# Patient Record
Sex: MALE | Race: WHITE | NOT HISPANIC OR LATINO | Employment: OTHER | URBAN - METROPOLITAN AREA
[De-identification: names, ages, dates, MRNs, and addresses within clinical notes are randomized per-mention and may not be internally consistent; named-entity substitution may affect disease eponyms.]

---

## 2017-03-03 DIAGNOSIS — E78.5 DYSLIPIDEMIA: Primary | ICD-10-CM

## 2017-03-08 ENCOUNTER — OFFICE VISIT (OUTPATIENT)
Dept: CARDIOLOGY | Facility: CLINIC | Age: 62
End: 2017-03-08

## 2017-03-08 VITALS
DIASTOLIC BLOOD PRESSURE: 92 MMHG | HEIGHT: 75 IN | SYSTOLIC BLOOD PRESSURE: 126 MMHG | HEART RATE: 99 BPM | BODY MASS INDEX: 28.17 KG/M2 | WEIGHT: 226.6 LBS

## 2017-03-08 DIAGNOSIS — R06.00 DYSPNEA, UNSPECIFIED TYPE: Primary | ICD-10-CM

## 2017-03-08 DIAGNOSIS — R94.31 ABNORMAL EKG: ICD-10-CM

## 2017-03-08 DIAGNOSIS — E78.5 HYPERLIPIDEMIA, UNSPECIFIED HYPERLIPIDEMIA TYPE: ICD-10-CM

## 2017-03-08 PROCEDURE — 99213 OFFICE O/P EST LOW 20 MIN: CPT | Performed by: INTERNAL MEDICINE

## 2017-03-08 NOTE — PROGRESS NOTES
"    Subjective:     Encounter Date:03/08/2017      Patient ID: Christopher Raymond is a 61 y.o.  white male, manager of a [employer], from [place], [place].     PHYSICIAN: Reggie Monzon MD  OPHTHALMOLOGIST:  Unknown    Chief Complaint:   Chief Complaint   Patient presents with   • Follow-up     Problem List:  1. Abnormal EKG with asymptomatic bifascicular block:  a. Abnormal EKG with chronic right bundle branch block with left anterior hemiblock.  b. Remote acceptable echocardiographic GXT, November 2007.  c. Residual class I symptoms with acceptable echocardiographic GXT.  2. Remote tonsillectomy, 1967.  3. Mildly overweight.  4. Mild dyslipidemia.  5. Recent cataract surgery, 2016.    Allergies   Allergen Reactions   • Penicillins          Current Outpatient Prescriptions:   •  Ascorbic Acid (VITAMIN C PO), Take 1,500 mg by mouth daily., Disp: , Rfl:   •  aspirin 81 MG tablet, Take 81 mg by mouth daily., Disp: , Rfl:   •  Multiple Vitamins-Minerals (MULTIVITAMIN PO), Take  by mouth daily., Disp: , Rfl:     History of Present Illness Patient returns for scheduled 7-month followup. He notes that he has recently had cataract surgery, and his doctor spent a lot of his visit time on the computer. He has done well since last being seen in our office.  He would like for his weight to be down more than it is, but \"I'm doing okay.\"  He has been walking for exercise but would like to do more. He has no issues cardiac-wise with his walking.  He notes that he is sensitive to some \"muscle stuff\" in his chest with his weight-lifting (about 10 pounds of weight).  He is scheduled to have lab work and needs to have a physical done and appreciates hearing from our office in regard to his lab results. He did not get a flu shot in the fall \"because I don't ever get the flu.\" He reports that his wife has been sick since returning from a trip to [place], so he has \"upped my dose of vitamin C.\"  Patient " "otherwise denies chest pain, shortness of breath, PND, edema, palpitations, syncope or presyncope at this time.      ROS   Obtained and otherwise negative except as outlined in problem list and HPI.    Procedures       Objective:       Vitals:    03/08/17 0916 03/08/17 0919   BP: 130/86 126/92   BP Location: Left arm Left arm   Patient Position: Sitting Standing   Pulse: 99    Weight: 226 lb 9.6 oz (103 kg)    Height: 75\" (190.5 cm)      Body mass index is 28.32 kg/(m^2).   Last weight:  229 lbs.    Physical Exam   Constitutional: He appears well-developed and well-nourished.   HENT:   Head: Normocephalic and atraumatic.   Mouth/Throat: Oropharynx is clear and moist.   Neck: Neck supple. No JVD present. Carotid bruit is not present. No thyromegaly present.   Cardiovascular: Normal rate and regular rhythm.  Exam reveals no gallop, no S3 and no friction rub.    No murmur heard.  Pulses:       Dorsalis pedis pulses are 2+ on the right side, and 2+ on the left side.        Posterior tibial pulses are 2+ on the right side, and 2+ on the left side.   Pulmonary/Chest: Effort normal and breath sounds normal.   Abdominal: Soft. He exhibits no mass. There is no hepatosplenomegaly. There is no tenderness.   Lymphadenopathy:     He has no cervical adenopathy.   Neurological: He is alert. He has normal strength. No cranial nerve deficit or sensory deficit.   Skin: Skin is warm, dry and intact.       Lab Review:   Lab Results   Component Value Date    GLUCOSE 92 02/15/2016    BUN 9 02/15/2016    CREATININE 0.8 02/15/2016    EGFRIFNONA 91 12/10/2014    EGFRIFAFRI 105 12/10/2014    BCR 10 12/10/2014    CO2 31 02/15/2016    CALCIUM 9.1 02/15/2016    PROTENTOTREF 6.7 12/10/2014    ALBUMIN 4.0 02/15/2016    LABIL2 1.8 12/10/2014    AST 34 (H) 02/15/2016    ALT 42 (H) 02/15/2016       Lab Results   Component Value Date    WBC 4.62 02/15/2016    HGB 13.7 02/15/2016    HCT 42.8 02/15/2016    MCV 90.9 02/15/2016     02/15/2016 "       Lab Results   Component Value Date    TSH 0.783 02/15/2016       Lab Results   Component Value Date    TRIG 113 02/15/2016    TRIG 90 12/10/2014     Lab Results   Component Value Date    HDL 28 (L) 02/15/2016    HDL 34 (L) 12/10/2014     Lab Results   Component Value Date     (H) 12/10/2014         Assessment:   Overall continued acceptable course with no interim cardiopulmonary complaints with good functional status. We will defer additional diagnostic or therapeutic intervention from a cardiac perspective at this time. The importance of influenza immunization is discussed with the patient.  Hopefully, we will be allowed to obtain and review his upcoming laboratory results with him by letter.       Diagnosis Plan   1. Dyspnea, unspecified type     2. Abnormal EKG     3. Hyperlipidemia, unspecified hyperlipidemia type            Plan:         1. Patient to continue current medications and close follow up with the above providers.  2. Influenza immunization this coming fall is strongly encouraged.  3. Tentative cardiology follow up in March 2018, or patient may return sooner PRN.         Transcribed by Jennifer Muñoz for Dr. Jaedn Doran at 9:32 AM on 03/08/2017      IJaden MD, [facility], personally performed the services described in this documentation as scribed by the above named individual in my presence, and it is both accurate and complete. At 9:27 AM on 03/08/2017

## 2017-03-13 ENCOUNTER — OFFICE VISIT (OUTPATIENT)
Dept: FAMILY MEDICINE CLINIC | Facility: CLINIC | Age: 62
End: 2017-03-13

## 2017-03-13 VITALS
DIASTOLIC BLOOD PRESSURE: 80 MMHG | BODY MASS INDEX: 28.65 KG/M2 | WEIGHT: 230.4 LBS | HEART RATE: 102 BPM | RESPIRATION RATE: 16 BRPM | TEMPERATURE: 98.1 F | SYSTOLIC BLOOD PRESSURE: 134 MMHG | OXYGEN SATURATION: 98 % | HEIGHT: 75 IN

## 2017-03-13 DIAGNOSIS — E55.9 VITAMIN D DEFICIENCY: Primary | ICD-10-CM

## 2017-03-13 DIAGNOSIS — Z00.00 WELL ADULT EXAM: ICD-10-CM

## 2017-03-13 DIAGNOSIS — E78.49 OTHER HYPERLIPIDEMIA: ICD-10-CM

## 2017-03-13 PROBLEM — M54.50 LOW BACK PAIN: Status: ACTIVE | Noted: 2017-03-13

## 2017-03-13 LAB
25(OH)D3 SERPL-MCNC: 21.5 NG/ML
ALBUMIN SERPL-MCNC: 4.1 G/DL (ref 3.2–4.8)
ALBUMIN/GLOB SERPL: 1.5 G/DL (ref 1.5–2.5)
ALP SERPL-CCNC: 106 U/L (ref 25–100)
ALT SERPL W P-5'-P-CCNC: 88 U/L (ref 7–40)
ANION GAP SERPL CALCULATED.3IONS-SCNC: 6 MMOL/L (ref 3–11)
ARTICHOKE IGE QN: 106 MG/DL (ref 0–130)
AST SERPL-CCNC: 64 U/L (ref 0–33)
BASOPHILS # BLD AUTO: 0.02 10*3/MM3 (ref 0–0.2)
BASOPHILS NFR BLD AUTO: 0.4 % (ref 0–1)
BILIRUB BLD-MCNC: NEGATIVE MG/DL
BILIRUB SERPL-MCNC: 0.9 MG/DL (ref 0.3–1.2)
BUN BLD-MCNC: 16 MG/DL (ref 9–23)
BUN/CREAT SERPL: 20 (ref 7–25)
CALCIUM SPEC-SCNC: 9.7 MG/DL (ref 8.7–10.4)
CHLORIDE SERPL-SCNC: 103 MMOL/L (ref 99–109)
CHOLEST SERPL-MCNC: 156 MG/DL (ref 0–200)
CLARITY, POC: CLEAR
CO2 SERPL-SCNC: 32 MMOL/L (ref 20–31)
COLOR UR: YELLOW
CREAT BLD-MCNC: 0.8 MG/DL (ref 0.6–1.3)
DEPRECATED RDW RBC AUTO: 48.1 FL (ref 37–54)
EOSINOPHIL # BLD AUTO: 0.16 10*3/MM3 (ref 0.1–0.3)
EOSINOPHIL NFR BLD AUTO: 3.3 % (ref 0–3)
ERYTHROCYTE [DISTWIDTH] IN BLOOD BY AUTOMATED COUNT: 13.7 % (ref 11.3–14.5)
GFR SERPL CREATININE-BSD FRML MDRD: 99 ML/MIN/1.73
GLOBULIN UR ELPH-MCNC: 2.8 GM/DL
GLUCOSE BLD-MCNC: 82 MG/DL (ref 70–100)
GLUCOSE UR STRIP-MCNC: NEGATIVE MG/DL
HCT VFR BLD AUTO: 43 % (ref 38.9–50.9)
HDLC SERPL-MCNC: 35 MG/DL (ref 40–60)
HGB BLD-MCNC: 13.6 G/DL (ref 13.1–17.5)
IMM GRANULOCYTES # BLD: 0 10*3/MM3 (ref 0–0.03)
IMM GRANULOCYTES NFR BLD: 0 % (ref 0–0.6)
KETONES UR QL: NEGATIVE
LEUKOCYTE EST, POC: NEGATIVE
LYMPHOCYTES # BLD AUTO: 1.69 10*3/MM3 (ref 0.6–4.8)
LYMPHOCYTES NFR BLD AUTO: 35.2 % (ref 24–44)
MCH RBC QN AUTO: 30.2 PG (ref 27–31)
MCHC RBC AUTO-ENTMCNC: 31.6 G/DL (ref 32–36)
MCV RBC AUTO: 95.3 FL (ref 80–99)
MONOCYTES # BLD AUTO: 0.5 10*3/MM3 (ref 0–1)
MONOCYTES NFR BLD AUTO: 10.4 % (ref 0–12)
NEUTROPHILS # BLD AUTO: 2.43 10*3/MM3 (ref 1.5–8.3)
NEUTROPHILS NFR BLD AUTO: 50.7 % (ref 41–71)
NITRITE UR-MCNC: NEGATIVE MG/ML
PH UR: 7 [PH] (ref 5–8)
PLATELET # BLD AUTO: 233 10*3/MM3 (ref 150–450)
PMV BLD AUTO: 9.9 FL (ref 6–12)
POTASSIUM BLD-SCNC: 3.6 MMOL/L (ref 3.5–5.5)
PROT SERPL-MCNC: 6.9 G/DL (ref 5.7–8.2)
PROT UR STRIP-MCNC: NEGATIVE MG/DL
PSA SERPL-MCNC: 1.1 NG/ML (ref 0–4)
RBC # BLD AUTO: 4.51 10*6/MM3 (ref 4.2–5.76)
RBC # UR STRIP: NEGATIVE /UL
SODIUM BLD-SCNC: 141 MMOL/L (ref 132–146)
SP GR UR: 1.01 (ref 1–1.03)
TRIGL SERPL-MCNC: 145 MG/DL (ref 0–150)
TSH SERPL DL<=0.05 MIU/L-ACNC: 1 MIU/ML (ref 0.35–5.35)
UROBILINOGEN UR QL: NORMAL
WBC NRBC COR # BLD: 4.8 10*3/MM3 (ref 3.5–10.8)

## 2017-03-13 PROCEDURE — 80053 COMPREHEN METABOLIC PANEL: CPT | Performed by: FAMILY MEDICINE

## 2017-03-13 PROCEDURE — 36415 COLL VENOUS BLD VENIPUNCTURE: CPT | Performed by: FAMILY MEDICINE

## 2017-03-13 PROCEDURE — 82306 VITAMIN D 25 HYDROXY: CPT | Performed by: FAMILY MEDICINE

## 2017-03-13 PROCEDURE — 84443 ASSAY THYROID STIM HORMONE: CPT | Performed by: FAMILY MEDICINE

## 2017-03-13 PROCEDURE — 99396 PREV VISIT EST AGE 40-64: CPT | Performed by: FAMILY MEDICINE

## 2017-03-13 PROCEDURE — 85025 COMPLETE CBC W/AUTO DIFF WBC: CPT | Performed by: FAMILY MEDICINE

## 2017-03-13 PROCEDURE — 81003 URINALYSIS AUTO W/O SCOPE: CPT | Performed by: FAMILY MEDICINE

## 2017-03-13 PROCEDURE — 84153 ASSAY OF PSA TOTAL: CPT | Performed by: FAMILY MEDICINE

## 2017-03-13 PROCEDURE — 80061 LIPID PANEL: CPT | Performed by: FAMILY MEDICINE

## 2017-03-13 NOTE — PROGRESS NOTES
"Subjective   Christopher Ryamond is a 61 y.o. male and is here for a comprehensive physical exam. The patient reports no problems.        Are you taking aspirin daily? yes        The following portions of the patient's history were reviewed and updated as appropriate: allergies, current medications, past family history, past medical history, past social history, past surgical history and problem list.    Review of Systems  Do you have pain that bothers you in your daily life? no  A comprehensive review of systems was negative.    Objective   Visit Vitals   • /80   • Pulse 102   • Temp 98.1 °F (36.7 °C)   • Resp 16   • Ht 75\" (190.5 cm)   • Wt 230 lb 6.4 oz (105 kg)   • SpO2 98%   • BMI 28.8 kg/m2       General Appearance:    Alert, cooperative, no distress, appears stated age   Head:    Normocephalic, without obvious abnormality, atraumatic   Eyes:    PERRL, conjunctiva/corneas clear, EOM's intact, fundi     benign, both eyes        Ears:    Normal TM's and external ear canals, both ears   Nose:   Nares normal, septum midline, mucosa normal, no drainage    or sinus tenderness   Throat:   Lips, mucosa, and tongue normal; teeth and gums normal   Neck:   Supple, symmetrical, trachea midline, no adenopathy;        thyroid:  No enlargement/tenderness/nodules; no carotid    bruit or JVD   Back:     Symmetric, no curvature, ROM normal, no CVA tenderness   Lungs:     Clear to auscultation bilaterally, respirations unlabored   Chest wall:    No tenderness or deformity   Heart:    Regular rate and rhythm, S1 and S2 normal, no murmur, rub   or gallop   Abdomen:     Soft, non-tender, bowel sounds active all four quadrants,     no masses, no organomegaly   Genitalia:    Normal male without lesion, discharge or tenderness   Rectal:    deferred   Extremities:   Extremities normal, atraumatic, no cyanosis or edema, mild creps at the knees   Pulses:   2+ and symmetric all extremities   Skin:   Skin color, texture, turgor normal, no " rashes or lesions   Lymph nodes:   Cervical, supraclavicular, and axillary nodes normal   Neurologic:   CNII-XII intact. Normal strength, sensation and reflexes       throughout        Assessment/Plan   Healthy male exam.      1. Degenerative joint disease of the knees moderate, low back pain stable, bifascicular block stable, hyperlipidemia.  2. Patient Counseling:  --Nutrition: Stressed importance of moderation in sodium/caffeine intake, saturated fat and cholesterol, caloric balance, sufficient intake of fresh fruits, vegetables, fiber, calcium, iron, and 1 mg of folate supplement per day (for females capable of pregnancy).  --Discussed the issue of estrogen replacement, calcium supplement, and the daily use of baby aspirin.  --Exercise: Stressed the importance of regular exercise.   --Substance Abuse: Discussed cessation/primary prevention of tobacco, alcohol, or other drug use; driving or other dangerous activities under the influence; availability of treatment for abuse.    --Sexuality: Discussed sexually transmitted diseases, partner selection, use of condoms, avoidance of unintended pregnancy  and contraceptive alternatives.   --Injury prevention: Discussed safety belts, safety helmets, smoke detector, smoking near bedding or upholstery.   --Dental health: Discussed importance of regular tooth brushing, flossing, and dental visits.  --Immunizations reviewed.  --Discussed benefits of screening colonoscopy.  --After hours service discussed with patient    3. Discussed the patient's BMI with him.  The BMI is above average; BMI management plan is completed  4. Follow up in one year    See cpx form

## 2017-04-17 ENCOUNTER — LAB (OUTPATIENT)
Dept: FAMILY MEDICINE CLINIC | Facility: CLINIC | Age: 62
End: 2017-04-17

## 2017-04-17 DIAGNOSIS — E78.01 FAMILIAL HYPERCHOLESTEROLEMIA: Primary | ICD-10-CM

## 2017-04-17 LAB
ALBUMIN SERPL-MCNC: 4.2 G/DL (ref 3.2–4.8)
ALBUMIN/GLOB SERPL: 1.4 G/DL (ref 1.5–2.5)
ALP SERPL-CCNC: 99 U/L (ref 25–100)
ALT SERPL W P-5'-P-CCNC: 38 U/L (ref 7–40)
ANION GAP SERPL CALCULATED.3IONS-SCNC: 6 MMOL/L (ref 3–11)
AST SERPL-CCNC: 33 U/L (ref 0–33)
BILIRUB SERPL-MCNC: 0.6 MG/DL (ref 0.3–1.2)
BUN BLD-MCNC: 12 MG/DL (ref 9–23)
BUN/CREAT SERPL: 15 (ref 7–25)
CALCIUM SPEC-SCNC: 9.5 MG/DL (ref 8.7–10.4)
CHLORIDE SERPL-SCNC: 105 MMOL/L (ref 99–109)
CO2 SERPL-SCNC: 29 MMOL/L (ref 20–31)
CREAT BLD-MCNC: 0.8 MG/DL (ref 0.6–1.3)
GFR SERPL CREATININE-BSD FRML MDRD: 99 ML/MIN/1.73
GLOBULIN UR ELPH-MCNC: 3 GM/DL
GLUCOSE BLD-MCNC: 94 MG/DL (ref 70–100)
POTASSIUM BLD-SCNC: 3.7 MMOL/L (ref 3.5–5.5)
PROT SERPL-MCNC: 7.2 G/DL (ref 5.7–8.2)
SODIUM BLD-SCNC: 140 MMOL/L (ref 132–146)

## 2017-04-17 PROCEDURE — 80053 COMPREHEN METABOLIC PANEL: CPT | Performed by: FAMILY MEDICINE

## 2017-04-17 PROCEDURE — 36415 COLL VENOUS BLD VENIPUNCTURE: CPT | Performed by: FAMILY MEDICINE

## 2017-04-19 ENCOUNTER — TELEPHONE (OUTPATIENT)
Dept: CARDIOLOGY | Facility: CLINIC | Age: 62
End: 2017-04-19

## 2017-04-19 NOTE — TELEPHONE ENCOUNTER
Returning phone call per patient email. Patient wanted Dr. Doran to look at labs that were were done by PCP. Also, patient had too much to drink on Saturday before first lab draw and CMP was repeated by PCP on 4/17/2017.

## 2017-12-11 ENCOUNTER — OFFICE VISIT (OUTPATIENT)
Dept: ORTHOPEDIC SURGERY | Facility: CLINIC | Age: 62
End: 2017-12-11

## 2017-12-11 VITALS
DIASTOLIC BLOOD PRESSURE: 109 MMHG | BODY MASS INDEX: 30.13 KG/M2 | WEIGHT: 234.79 LBS | HEART RATE: 89 BPM | HEIGHT: 74 IN | SYSTOLIC BLOOD PRESSURE: 164 MMHG

## 2017-12-11 DIAGNOSIS — M79.675 PAIN OF TOE OF LEFT FOOT: Primary | ICD-10-CM

## 2017-12-11 PROCEDURE — 99214 OFFICE O/P EST MOD 30 MIN: CPT | Performed by: ORTHOPAEDIC SURGERY

## 2017-12-11 NOTE — PROGRESS NOTES
NEW PATIENT    Patient: Christopher Raymond  : 1955    Primary Care Provider: Reggie Monzon MD    Requesting Provider: As above    Pain of the Left Foot      History    Chief Complaint: Left third and fourth toe pain    History of Present Illness: This is a pleasant 62-year-old gentleman who is seen for several foot problems in the past.  He is here with a different problem.  He notes that over the past several years, he has had some crossing of the left third toe over the fourth toe.  It has become a nuisance.  He reports the pain is mild, rates it as 1-2 out of 10.  But he wants to make sure that he is not harming anything by taping the toes 1,2 and 3 together, to keep the third from rubbing on the fourth.  He made his own elastic band to do this.  He wears it in shoes.    Current Outpatient Prescriptions on File Prior to Visit   Medication Sig Dispense Refill   • Ascorbic Acid (VITAMIN C PO) Take 1,500 mg by mouth daily.     • aspirin 81 MG tablet Take 81 mg by mouth daily.     • Multiple Vitamins-Minerals (MULTIVITAMIN PO) Take  by mouth daily.       No current facility-administered medications on file prior to visit.       Allergies   Allergen Reactions   • Penicillins       Past Medical History:   Diagnosis Date   • Hematospermia      Past Surgical History:   Procedure Laterality Date   • CATARACT EXTRACTION     • HERNIA REPAIR     • TONSILLECTOMY       Family History   Problem Relation Age of Onset   • Hypotension Mother    • Stroke Father    • Hypertension Father       Social History     Social History   • Marital status:      Spouse name: N/A   • Number of children: N/A   • Years of education: N/A     Occupational History   • Not on file.     Social History Main Topics   • Smoking status: Never Smoker   • Smokeless tobacco: Never Used   • Alcohol use 0.6 - 1.2 oz/week     1 - 2 Cans of beer per week      Comment: very rare   • Drug use: No   • Sexual activity: Defer     Other Topics Concern  "  • Not on file     Social History Narrative        Review of Systems   Constitutional: Negative.    HENT: Negative.    Eyes: Negative.    Respiratory: Negative.    Cardiovascular: Negative.    Gastrointestinal: Negative.    Endocrine: Negative.    Genitourinary: Negative.    Musculoskeletal: Positive for arthralgias.   Skin: Negative.    Allergic/Immunologic: Negative.    Neurological: Negative.    Hematological: Negative.    Psychiatric/Behavioral: Negative.        The following portions of the patient's history were reviewed and updated as appropriate: allergies, current medications, past family history, past medical history, past social history, past surgical history and problem list.    Physical Exam:   BP (!) 164/109  Pulse 89  Ht 189 cm (74.41\")  Wt 107 kg (234 lb 12.6 oz)  BMI 29.81 kg/m2  GENERAL: Body habitus: normal weight for height    Lower extremity edema: Left: none; Right: none    Varicose veins:  Left: mild; Right: mild    Gait: normal     Mental Status:  awake and alert; oriented to person, place, and time    Voice:  clear  SKIN:  Normal    Hair Growth:  Right:normal; Left:  normal  NAILS: Toenails: normal  HEENT: Head: Normocephalic, atraumatic,  without obvious abnormality.  eye: normal external eye, no icterus  ears: normal external ears  nose: normal external nose  pharynx: dental hygiene adequate  PULM:  Repiratory effort normal  CV:  Dorsalis Pedis:  Right: 2+; Left:2+    Posterior Tibial: Right:2+; Left:2+    Capillary Refill:  Brisk  MSK:  Hand:right handed      Tibia:  Right:  non tender; Left:  non tender      Ankle:  Right: non tender, ROM  normal and motor function  normal; Left:  non tender, ROM  normal and motor function  normal      Foot:  Right:  non tender; Left:  No tenderness in the left foot today.  In stance the second and third toes angle laterally, and the third toe does cross the nail fold and the nail of the fourth toe.  The fourth toe also curls at the PIP and DIP " joint, angling it under the third toe.  Toes are mobile, they're easily .      NEURO:      [place] 5.07 monofilament test: normal    Lower extremity sensation: intact     Reflexes:  Biceps:  Right:  not tested; Left:  not tested           Quads:  Right:  not tested; Left:  not tested           Ankle:  Right:  not tested; Left:  not tested      Calf Atrophy:none    Motor Function: all 5/5         Medical Decision Making    Data Review:   ordered and reviewed x-rays today    Assessment and Plan/ Diagnosis/Treatment options:   1. Pain of toe of left foot  He does have lateral angulation of the second and third toes at the metatarsal phalangeal joint, that makes the third toe crosses the fourth toe at the nail bed, the fourth toe also curls which contributes to the crossing.  We discussed the options.  I explained that I don't think he is harming himself at all by using the elastic band to keep the toes from rubbing.  His x-rays were compared to those from 2010 and there is really no change.  So there is no evidence that it is causing valgus of the great toe or causing any problem.  I also showed him how to use a banana pad, that might work also to keep the toes from rubbing.  I explained that the only surgery would involve straightening all 3 toes, and given the low level of pain that he has at would not recommend that.  He was satisfied, he primarily want to make sure he was not causing harm.  I'll be happy to see him any time  -

## 2018-03-12 ENCOUNTER — OFFICE VISIT (OUTPATIENT)
Dept: FAMILY MEDICINE CLINIC | Facility: CLINIC | Age: 63
End: 2018-03-12

## 2018-03-12 VITALS
OXYGEN SATURATION: 96 % | BODY MASS INDEX: 28.23 KG/M2 | HEIGHT: 75 IN | TEMPERATURE: 99.7 F | DIASTOLIC BLOOD PRESSURE: 88 MMHG | SYSTOLIC BLOOD PRESSURE: 158 MMHG | RESPIRATION RATE: 16 BRPM | HEART RATE: 115 BPM | WEIGHT: 227 LBS

## 2018-03-12 DIAGNOSIS — J01.00 SUBACUTE MAXILLARY SINUSITIS: ICD-10-CM

## 2018-03-12 DIAGNOSIS — J40 BRONCHITIS: Primary | ICD-10-CM

## 2018-03-12 PROCEDURE — 99213 OFFICE O/P EST LOW 20 MIN: CPT | Performed by: FAMILY MEDICINE

## 2018-03-12 RX ORDER — DOXYCYCLINE HYCLATE 100 MG/1
100 CAPSULE ORAL 2 TIMES DAILY
Qty: 20 CAPSULE | Refills: 0 | Status: SHIPPED | OUTPATIENT
Start: 2018-03-12 | End: 2018-03-21

## 2018-03-13 NOTE — PROGRESS NOTES
"Subjective   Christopher Raymond is a 62 y.o. male.     Sinusitis   This is a new (recent trip came back with uri now has yellow sputum) problem. The current episode started in the past 7 days. The problem is unchanged. The maximum temperature recorded prior to his arrival was 100.4 - 100.9 F. The fever has been present for 1 to 2 days. The pain is mild. Associated symptoms include congestion, coughing and sinus pressure. Pertinent negatives include no chills, shortness of breath or swollen glands. Past treatments include acetaminophen and lying down. The treatment provided mild relief.        The following portions of the patient's history were reviewed and updated as appropriate: allergies, current medications, past social history and problem list.    Review of Systems   Constitutional: Negative for chills and fever.   HENT: Positive for congestion and sinus pressure.    Respiratory: Positive for cough. Negative for shortness of breath.    Cardiovascular: Negative for chest pain and leg swelling.   Gastrointestinal: Negative for diarrhea, nausea and vomiting.   Genitourinary: Negative for dysuria and hematuria.   Musculoskeletal: Positive for myalgias. Negative for joint swelling.       Objective   /88 (BP Location: Left arm, Patient Position: Sitting, Cuff Size: Adult)   Pulse 115   Temp 99.7 °F (37.6 °C) (Oral)   Resp 16   Ht 190.5 cm (75\")   Wt 103 kg (227 lb)   SpO2 96%   BMI 28.37 kg/m²   Physical Exam   Constitutional: He is oriented to person, place, and time. He appears well-developed and well-nourished. He is cooperative.   HENT:   Head: Normocephalic.   Right Ear: External ear normal.   Left Ear: External ear normal.   Nose: Nose normal.   Mouth/Throat: Oropharynx is clear and moist.   Cloudy yellow pnd   Eyes: Conjunctivae are normal. Pupils are equal, round, and reactive to light. No scleral icterus.   Neck: Neck supple. Carotid bruit is not present. No thyromegaly present.   Cardiovascular: " Normal rate and regular rhythm.    Pulmonary/Chest: Effort normal.   Scattered upper rhonchi   Abdominal: There is no hepatosplenomegaly.   Musculoskeletal: Normal range of motion.   Neurological: He is alert and oriented to person, place, and time.   No focal deficits no lateralizing signs   Skin: Skin is warm and dry. No rash noted.   Psychiatric: He has a normal mood and affect. Cognition and memory are normal.   Nursing note and vitals reviewed.      Assessment/Plan   Problem List Items Addressed This Visit     None      Visit Diagnoses     Bronchitis    -  Primary          New Medications Ordered This Visit   Medications   • doxycycline (VIBRAMYCIN) 100 MG capsule     Sig: Take 1 capsule by mouth 2 (Two) Times a Day.     Dispense:  20 capsule     Refill:  0

## 2018-03-14 ENCOUNTER — OFFICE VISIT (OUTPATIENT)
Dept: CARDIOLOGY | Facility: CLINIC | Age: 63
End: 2018-03-14

## 2018-03-14 VITALS
HEIGHT: 75 IN | HEART RATE: 108 BPM | WEIGHT: 219 LBS | BODY MASS INDEX: 27.23 KG/M2 | SYSTOLIC BLOOD PRESSURE: 118 MMHG | DIASTOLIC BLOOD PRESSURE: 78 MMHG

## 2018-03-14 DIAGNOSIS — R94.31 ABNORMAL EKG: Primary | ICD-10-CM

## 2018-03-14 DIAGNOSIS — R06.09 DYSPNEA ON EXERTION: ICD-10-CM

## 2018-03-14 DIAGNOSIS — R09.89 LABILE HYPERTENSION: ICD-10-CM

## 2018-03-14 DIAGNOSIS — I45.2 BIFASCICULAR BLOCK: ICD-10-CM

## 2018-03-14 PROCEDURE — 93000 ELECTROCARDIOGRAM COMPLETE: CPT | Performed by: INTERNAL MEDICINE

## 2018-03-14 PROCEDURE — 99214 OFFICE O/P EST MOD 30 MIN: CPT | Performed by: INTERNAL MEDICINE

## 2018-03-14 NOTE — PROGRESS NOTES
"    Subjective:     Encounter Date:03/14/2018    Patient ID: Christopher Raymond is a 62 y.o.  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     PHYSICIAN: Reggie Monzon MD  OPHTHALMOLOGIST:  Unknown       Chief Complaint:   Chief Complaint   Patient presents with   • Hyperlipidemia     Problem List:  1. Abnormal EKG with asymptomatic bifascicular block:  a. Abnormal EKG with chronic right bundle branch block with left anterior hemiblock.  b. Remote acceptable echocardiographic GXT, November 2007.  c. Residual class I symptoms with acceptable echocardiographic GXT.  2. Remote tonsillectomy, 1967.  3. Mildly overweight, BMI 27.4.  4. Mild dyslipidemia.  5. Remote cataract surgery, 2016.     Allergies   Allergen Reactions   • Penicillins          Current Outpatient Prescriptions:   •  Ascorbic Acid (VITAMIN C PO), Take 1,000 mg by mouth Daily., Disp: , Rfl:   •  aspirin 81 MG tablet, Take 81 mg by mouth daily., Disp: , Rfl:   •  doxycycline (VIBRAMYCIN) 100 MG capsule, Take 1 capsule by mouth 2 (Two) Times a Day., Disp: 20 capsule, Rfl: 0  •  Multiple Vitamins-Minerals (MULTIVITAMIN PO), Take  by mouth daily., Disp: , Rfl:     HISTORY OF PRESENT ILLNESS: Patient returns for scheduled annual followup. He says that he was recently diagnosed with bronchitis and a sinus infection, but \"I'm on the downside of it.\"  He notes that he was traveling last week and must have picked it up then.  He says that it bothers him mostly at night with the cough and congestion.  His EKG today is abnormal but stable, other than his heart rate is up, and he is told that this could be a result of his sickness.  The patient states that he gets some \"surface muscle\" chest pain, on one side or the other, and it concerns him when it is on the left side of his chest.  It lasts for a few minutes when it occurs.  He sometimes experiences this pain when he is driving because he drives with his left arm " "extended, and if he changes the position of his arm, the pain goes away.  He does not have any shortness of breath with it, and he does not experience it with activity.  His blood pressure has been elevated on occasion when he has been seen in other doctor's offices, but it is normal today; he states it has been \"trending up.\"  He reports that he donates blood every 8 weeks, and his blood pressure is usually less than 140/90, \"but it's close to it.\"  He states that he probably has not been drinking as much water as he normally does because he has been working from home instead of going into the office.  He is otherwise active on a daily basis and continues to enjoy playing [employer]; he has no symptoms from a cardiopulmonary perspective with his activities.  Patient otherwise denies chest pain, shortness of breath, PND, edema, palpitations, syncope or presyncope at this time.        ROS   Obtained and otherwise negative except as outlined in problem list and HPI.      ECG 12 Lead  Date/Time: 3/14/2018 10:06 AM  Performed by: JOSHUA TOTH  Authorized by: JOSHUA TOTH   Comparison: not compared with previous ECG   Previous ECG: no previous ECG available  Rhythm: sinus tachycardia  BPM: 109  Conduction: complete RBBB and LPFB  Clinical impression: abnormal ECG  Comments:  ms   ms  QTc 514 ms               Objective:       Vitals:    03/14/18 0855 03/14/18 0858 03/14/18 0913   BP: 125/90 105/80 118/78   BP Location: Left arm Left arm Right arm   Patient Position: Sitting Standing Sitting   Pulse: 108     Weight: 99.3 kg (219 lb)     Height: 190.5 cm (75\")       Body mass index is 27.37 kg/m².   Last weight:  226 lbs.    Physical Exam   Constitutional: He is oriented to person, place, and time. He appears well-developed and well-nourished.   Neck: No JVD present. Carotid bruit is not present. No thyromegaly present.   Cardiovascular: Regular rhythm, S1 normal, S2 normal and normal heart sounds.  Exam " reveals no gallop, no S3 and no friction rub.    No murmur heard.  Pulses:       Dorsalis pedis pulses are 2+ on the right side, and 2+ on the left side.        Posterior tibial pulses are 2+ on the right side, and 2+ on the left side.   Pulmonary/Chest: Effort normal and breath sounds normal. He has no wheezes. He has no rhonchi. He has no rales.   Abdominal: Soft. He exhibits no mass. There is no hepatosplenomegaly. There is no tenderness. There is no guarding.   Bowel sounds audible x4   Musculoskeletal: Normal range of motion. He exhibits no edema.   Lymphadenopathy:     He has no cervical adenopathy.   Neurological: He is alert and oriented to person, place, and time.   Skin: Skin is warm, dry and intact. No rash noted.   Vitals reviewed.        Lab Review:   Lab Results   Component Value Date    GLUCOSE 94 04/17/2017    BUN 12 04/17/2017    CREATININE 0.80 04/17/2017    EGFRIFNONA 99 04/17/2017    EGFRIFAFRI 105 12/10/2014    BCR 15.0 04/17/2017    CO2 29.0 04/17/2017    CALCIUM 9.5 04/17/2017    PROTENTOTREF 6.7 12/10/2014    ALBUMIN 4.20 04/17/2017    LABIL2 1.4 (L) 04/17/2017    AST 33 04/17/2017    ALT 38 04/17/2017   Sodium - 140  Potassium - 3.7  Chloride - 105  CO2 - 29.0    Lab Results   Component Value Date    WBC 4.80 03/13/2017    HGB 13.6 03/13/2017    HCT 43.0 03/13/2017    MCV 95.3 03/13/2017     03/13/2017       Lab Results   Component Value Date    TSH 1.005 03/13/2017       Lab Results   Component Value Date    CHOL 156 03/13/2017     Lab Results   Component Value Date    TRIG 145 03/13/2017    TRIG 113 02/15/2016     Lab Results   Component Value Date    HDL 35 (L) 03/13/2017    HDL 28 (L) 02/15/2016     Lab Results   Component Value Date     03/13/2017     02/15/2016     Vitamin D - 21.5 (03/13/2017)  PSA - 1.100 (03/13/2017)      Assessment:   Overall continued acceptable course with no interim cardiopulmonary complaints with acceptable functional status. We will defer  additional diagnostic or therapeutic intervention from a cardiac perspective at this time.  The patient is told that it might be of benefit for him to purchase a home blood pressure monitor and check his blood pressure every now and then at home.  If his heart rate remains elevated, despite hydration and resolution of his febrile illness, he may need echocardiogram/event recorder monitor.       Diagnosis Plan   1. Abnormal EKG  Continue current treatment   2. Dyspnea on exertion  No recurrent angina pectoris or CHF.     3. Bifascicular block  EKG stable and unchanged   4. Labile hypertension  Blood pressure monitoring, weight loss, and followup recommended          Plan:         1. Patient to continue current medications and close follow up with the above providers.  2. Tentative cardiology follow up in March 2019, or patient may return sooner PRN.       Transcribed by Jennifer Muñoz for Dr. Jaden Doran at 9:06 AM on 03/14/2018       IJaden MD, FACC, personally performed the services described in this documentation as scribed by the above named individual in my presence, and it is both accurate and complete. At 10:04 AM on 03/14/2018

## 2018-03-20 ENCOUNTER — OFFICE VISIT (OUTPATIENT)
Dept: FAMILY MEDICINE CLINIC | Facility: CLINIC | Age: 63
End: 2018-03-20

## 2018-03-20 VITALS
BODY MASS INDEX: 27.35 KG/M2 | HEIGHT: 75 IN | HEART RATE: 98 BPM | OXYGEN SATURATION: 98 % | WEIGHT: 220 LBS | DIASTOLIC BLOOD PRESSURE: 88 MMHG | RESPIRATION RATE: 16 BRPM | SYSTOLIC BLOOD PRESSURE: 128 MMHG

## 2018-03-20 DIAGNOSIS — E55.9 VITAMIN D DEFICIENCY: ICD-10-CM

## 2018-03-20 DIAGNOSIS — Z00.00 WELL ADULT EXAM: Primary | ICD-10-CM

## 2018-03-20 LAB
BILIRUB BLD-MCNC: NEGATIVE MG/DL
CLARITY, POC: CLEAR
COLOR UR: YELLOW
GLUCOSE UR STRIP-MCNC: NEGATIVE MG/DL
KETONES UR QL: NEGATIVE
LEUKOCYTE EST, POC: NEGATIVE
NITRITE UR-MCNC: NEGATIVE MG/ML
PH UR: 6.5 [PH] (ref 5–8)
PROT UR STRIP-MCNC: NEGATIVE MG/DL
RBC # UR STRIP: NEGATIVE /UL
SP GR UR: 1.01 (ref 1–1.03)
UROBILINOGEN UR QL: NORMAL

## 2018-03-20 PROCEDURE — 81003 URINALYSIS AUTO W/O SCOPE: CPT | Performed by: FAMILY MEDICINE

## 2018-03-20 PROCEDURE — 36415 COLL VENOUS BLD VENIPUNCTURE: CPT | Performed by: FAMILY MEDICINE

## 2018-03-20 PROCEDURE — 99396 PREV VISIT EST AGE 40-64: CPT | Performed by: FAMILY MEDICINE

## 2018-03-20 RX ORDER — CHOLECALCIFEROL (VITAMIN D3) 50 MCG
2000 TABLET ORAL DAILY
COMMUNITY

## 2018-03-21 NOTE — PROGRESS NOTES
"Subjective   Christopher Raymond is a 62 y.o. male and is here for a comprehensive physical exam. The patient reports no problems.        Are you taking aspirin daily? yes        The following portions of the patient's history were reviewed and updated as appropriate: allergies, current medications, past family history, past medical history, past social history, past surgical history and problem list.    Review of Systems  Do you have pain that bothers you in your daily life? no  A comprehensive review of systems was negative.    Objective   /88   Pulse 98   Resp 16   Ht 190.5 cm (75\")   Wt 99.8 kg (220 lb)   SpO2 98%   BMI 27.50 kg/m²     General Appearance:    Alert, cooperative, no distress, appears stated age   Head:    Normocephalic, without obvious abnormality, atraumatic   Eyes:    PERRL, conjunctiva/corneas clear, EOM's intact, fundi     benign, both eyes        Ears:    Normal TM's and external ear canals, both ears   Nose:   Nares normal, septum midline, mucosa normal, no drainage    or sinus tenderness   Throat:   Lips, mucosa, and tongue normal; teeth and gums normal   Neck:   Supple, symmetrical, trachea midline, no adenopathy;        thyroid:  No enlargement/tenderness/nodules; no carotid    bruit or JVD   Back:     Symmetric, no curvature, ROM normal, no CVA tenderness   Lungs:     Clear to auscultation bilaterally, respirations unlabored   Chest wall:    No tenderness or deformity   Heart:    Regular rate and rhythm, S1 and S2 normal, no murmur, rub   or gallop   Abdomen:     Soft, non-tender, bowel sounds active all four quadrants,     no masses, no organomegaly   Genitalia:    Normal male without lesion, discharge or tenderness   Rectal:    Normal tone, normal prostate, no masses or tenderness;    guaiac negative stool   Extremities:   Extremities normal, atraumatic, no cyanosis or edema   Pulses:   2+ and symmetric all extremities   Skin:   Skin color, texture, turgor normal, no rashes or " lesions, numerous benign nevi   Lymph nodes:   Cervical, supraclavicular, and axillary nodes normal   Neurologic:   CNII-XII intact. Normal strength, sensation and reflexes       throughout        Assessment/Plan   Healthy male exam.      1. RBBB,LAFB,OA  2. Patient Counseling:  --Nutrition: Stressed importance of moderation in sodium/caffeine intake, saturated fat and cholesterol, caloric balance, sufficient intake of fresh fruits, vegetables, fiber, calcium, iron, and 1 mg of folate supplement per day (for females capable of pregnancy).  --Discussed the issue of estrogen replacement, calcium supplement, and the daily use of baby aspirin.  --Exercise: Stressed the importance of regular exercise.   --Substance Abuse: Discussed cessation/primary prevention of tobacco, alcohol, or other drug use; driving or other dangerous activities under the influence; availability of treatment for abuse.    --Sexuality: Discussed sexually transmitted diseases, partner selection, use of condoms, avoidance of unintended pregnancy  and contraceptive alternatives.   --Injury prevention: Discussed safety belts, safety helmets, smoke detector, smoking near bedding or upholstery.   --Dental health: Discussed importance of regular tooth brushing, flossing, and dental visits.  --Immunizations reviewed.  --Discussed benefits of screening colonoscopy.  --After hours service discussed with patient    3. Discussed the patient's BMI with him.  The BMI is in the acceptable range  4. Follow up in one year      See cpx form

## 2018-03-22 LAB
25(OH)D3+25(OH)D2 SERPL-MCNC: 36.6 NG/ML
ALBUMIN SERPL-MCNC: 4 G/DL (ref 3.2–4.8)
ALBUMIN/GLOB SERPL: 1.4 G/DL (ref 1.5–2.5)
ALP SERPL-CCNC: 83 U/L (ref 25–100)
ALT SERPL-CCNC: 60 U/L (ref 7–40)
AST SERPL-CCNC: 42 U/L (ref 0–33)
BASOPHILS # BLD AUTO: 0.01 10*3/MM3 (ref 0–0.2)
BASOPHILS NFR BLD AUTO: 0.2 % (ref 0–1)
BILIRUB SERPL-MCNC: 0.6 MG/DL (ref 0.3–1.2)
BUN SERPL-MCNC: 10 MG/DL (ref 9–23)
BUN/CREAT SERPL: 11.1 (ref 7–25)
CALCIUM SERPL-MCNC: 9.1 MG/DL (ref 8.7–10.4)
CHLORIDE SERPL-SCNC: 106 MMOL/L (ref 99–109)
CHOLEST SERPL-MCNC: 166 MG/DL (ref 0–200)
CO2 SERPL-SCNC: 31 MMOL/L (ref 20–31)
CREAT SERPL-MCNC: 0.9 MG/DL (ref 0.6–1.3)
EOSINOPHIL # BLD AUTO: 0.09 10*3/MM3 (ref 0–0.3)
EOSINOPHIL NFR BLD AUTO: 1.8 % (ref 0–3)
ERYTHROCYTE [DISTWIDTH] IN BLOOD BY AUTOMATED COUNT: 13.6 % (ref 11.3–14.5)
GFR SERPLBLD CREATININE-BSD FMLA CKD-EPI: 104 ML/MIN/1.73
GFR SERPLBLD CREATININE-BSD FMLA CKD-EPI: 86 ML/MIN/1.73
GLOBULIN SER CALC-MCNC: 2.8 GM/DL
GLUCOSE SERPL-MCNC: 99 MG/DL (ref 70–100)
HCT VFR BLD AUTO: 43.5 % (ref 38.9–50.9)
HCV AB S/CO SERPL IA: 0.1 S/CO RATIO (ref 0–0.9)
HDLC SERPL-MCNC: 33 MG/DL (ref 40–60)
HGB BLD-MCNC: 14.2 G/DL (ref 13.1–17.5)
IMM GRANULOCYTES # BLD: 0.01 10*3/MM3 (ref 0–0.03)
IMM GRANULOCYTES NFR BLD: 0.2 % (ref 0–0.6)
LDLC SERPL CALC-MCNC: 107 MG/DL (ref 0–100)
LYMPHOCYTES # BLD AUTO: 1.82 10*3/MM3 (ref 0.6–4.8)
LYMPHOCYTES NFR BLD AUTO: 36.5 % (ref 24–44)
MCH RBC QN AUTO: 31.1 PG (ref 27–31)
MCHC RBC AUTO-ENTMCNC: 32.6 G/DL (ref 32–36)
MCV RBC AUTO: 95.2 FL (ref 80–99)
MONOCYTES # BLD AUTO: 0.48 10*3/MM3 (ref 0–1)
MONOCYTES NFR BLD AUTO: 9.6 % (ref 0–12)
NEUTROPHILS # BLD AUTO: 2.58 10*3/MM3 (ref 1.5–8.3)
NEUTROPHILS NFR BLD AUTO: 51.7 % (ref 41–71)
PLATELET # BLD AUTO: 262 10*3/MM3 (ref 150–450)
POTASSIUM SERPL-SCNC: 4 MMOL/L (ref 3.5–5.5)
PROT SERPL-MCNC: 6.8 G/DL (ref 5.7–8.2)
PSA SERPL-MCNC: 0.92 NG/ML (ref 0–4)
RBC # BLD AUTO: 4.57 10*6/MM3 (ref 4.2–5.76)
SODIUM SERPL-SCNC: 141 MMOL/L (ref 132–146)
TRIGL SERPL-MCNC: 131 MG/DL (ref 0–150)
TSH SERPL DL<=0.005 MIU/L-ACNC: 0.73 MIU/ML (ref 0.35–5.35)
VLDLC SERPL CALC-MCNC: 26.2 MG/DL
WBC # BLD AUTO: 4.99 10*3/MM3 (ref 3.5–10.8)

## 2018-08-14 ENCOUNTER — OFFICE VISIT (OUTPATIENT)
Dept: FAMILY MEDICINE CLINIC | Facility: CLINIC | Age: 63
End: 2018-08-14

## 2018-08-14 VITALS
WEIGHT: 228 LBS | BODY MASS INDEX: 28.35 KG/M2 | OXYGEN SATURATION: 99 % | SYSTOLIC BLOOD PRESSURE: 186 MMHG | RESPIRATION RATE: 16 BRPM | HEIGHT: 75 IN | HEART RATE: 98 BPM | DIASTOLIC BLOOD PRESSURE: 100 MMHG

## 2018-08-14 DIAGNOSIS — M54.31 SCIATICA OF RIGHT SIDE: Primary | ICD-10-CM

## 2018-08-14 PROCEDURE — 99213 OFFICE O/P EST LOW 20 MIN: CPT | Performed by: FAMILY MEDICINE

## 2018-08-14 RX ORDER — BACLOFEN 10 MG/1
10 TABLET ORAL 3 TIMES DAILY
Qty: 30 TABLET | Refills: 0 | Status: SHIPPED | OUTPATIENT
Start: 2018-08-14 | End: 2018-08-23 | Stop reason: SDUPTHER

## 2018-08-14 RX ORDER — METHYLPREDNISOLONE 4 MG/1
TABLET ORAL
Qty: 1 EACH | Refills: 0 | Status: SHIPPED | OUTPATIENT
Start: 2018-08-14 | End: 2018-08-29

## 2018-08-15 ENCOUNTER — TELEPHONE (OUTPATIENT)
Dept: FAMILY MEDICINE CLINIC | Facility: CLINIC | Age: 63
End: 2018-08-15

## 2018-08-15 ENCOUNTER — OFFICE VISIT (OUTPATIENT)
Dept: FAMILY MEDICINE CLINIC | Facility: CLINIC | Age: 63
End: 2018-08-15

## 2018-08-15 VITALS
HEIGHT: 75 IN | DIASTOLIC BLOOD PRESSURE: 88 MMHG | RESPIRATION RATE: 16 BRPM | BODY MASS INDEX: 28.5 KG/M2 | OXYGEN SATURATION: 96 % | SYSTOLIC BLOOD PRESSURE: 142 MMHG | HEART RATE: 105 BPM

## 2018-08-15 DIAGNOSIS — M54.31 SCIATICA OF RIGHT SIDE: Primary | ICD-10-CM

## 2018-08-15 PROCEDURE — 96372 THER/PROPH/DIAG INJ SC/IM: CPT | Performed by: FAMILY MEDICINE

## 2018-08-15 PROCEDURE — 99213 OFFICE O/P EST LOW 20 MIN: CPT | Performed by: FAMILY MEDICINE

## 2018-08-15 RX ORDER — TRIAMCINOLONE ACETONIDE 40 MG/ML
40 INJECTION, SUSPENSION INTRA-ARTICULAR; INTRAMUSCULAR ONCE
Status: COMPLETED | OUTPATIENT
Start: 2018-08-15 | End: 2018-08-15

## 2018-08-15 RX ORDER — IBUPROFEN 600 MG/1
600 TABLET ORAL EVERY 6 HOURS PRN
Qty: 50 TABLET | Refills: 0 | Status: SHIPPED | OUTPATIENT
Start: 2018-08-15 | End: 2018-08-28 | Stop reason: SDUPTHER

## 2018-08-15 RX ADMIN — TRIAMCINOLONE ACETONIDE 40 MG: 40 INJECTION, SUSPENSION INTRA-ARTICULAR; INTRAMUSCULAR at 16:13

## 2018-08-15 NOTE — PROGRESS NOTES
"Jenifer Raymond is a 62 y.o. male.     Leg Pain    The incident occurred 12 to 24 hours ago (Bent over to put on socks and pain hit in right hip and lateral thigh). The incident occurred at home. There was no injury mechanism. The pain is present in the right thigh. The quality of the pain is described as burning and aching. The pain is moderate. The pain has been intermittent since onset. Associated symptoms include numbness and tingling. The symptoms are aggravated by movement and weight bearing. He has tried NSAIDs and non-weight bearing for the symptoms. The treatment provided no relief.        The following portions of the patient's history were reviewed and updated as appropriate: allergies, current medications, past social history and problem list.    Review of Systems   Constitutional: Negative.    HENT: Negative for congestion and postnasal drip.    Respiratory: Negative.    Cardiovascular: Negative for chest pain, palpitations and leg swelling.   Gastrointestinal: Negative.    Musculoskeletal: Positive for back pain. Negative for arthralgias, gait problem and myalgias.   Neurological: Positive for tingling and numbness. Negative for dizziness, tremors and weakness.   Psychiatric/Behavioral: Negative for behavioral problems and dysphoric mood. The patient is not nervous/anxious.        Objective   BP (!) 186/100   Pulse 98   Resp 16   Ht 190.5 cm (75\")   Wt 103 kg (228 lb)   SpO2 99%   BMI 28.50 kg/m²   Physical Exam   Constitutional: He is oriented to person, place, and time. He appears well-developed and well-nourished. He is cooperative.   HENT:   Head: Normocephalic.   Right Ear: External ear normal.   Left Ear: External ear normal.   Nose: Nose normal.   Mouth/Throat: Oropharynx is clear and moist.   Eyes: Pupils are equal, round, and reactive to light. Conjunctivae are normal. No scleral icterus.   Neck: Neck supple. Carotid bruit is not present. No thyromegaly present. "   Cardiovascular: Normal rate and regular rhythm.    Pulmonary/Chest: Effort normal and breath sounds normal.   Abdominal: There is no hepatosplenomegaly.   Musculoskeletal: Normal range of motion. He exhibits tenderness.   Radicular pain right lateral thigh slr negative  Sensation intact strength normal   Neurological: He is alert and oriented to person, place, and time.   No focal deficits no lateralizing signs   Skin: Skin is warm and dry. No rash noted.   Psychiatric: He has a normal mood and affect. Cognition and memory are normal.   Nursing note and vitals reviewed.      Assessment/Plan   Problem List Items Addressed This Visit     None      Visit Diagnoses     Sciatica of right side    -  Primary          New Medications Ordered This Visit   Medications   • MethylPREDNISolone (MEDROL, SEBASTIEN,) 4 MG tablet     Sig: Take as directed on package instructions.     Dispense:  1 each     Refill:  0   • baclofen (LIORESAL) 10 MG tablet     Sig: Take 1 tablet by mouth 3 (Three) Times a Day.     Dispense:  30 tablet     Refill:  0     If no better in 2 days call or rtc for further eval.

## 2018-08-15 NOTE — TELEPHONE ENCOUNTER
Called informed pt, he verbalized understanding and he has an appt today 8/15/18.  KATHERINE Garcia  [facility]    ----- Message from Reggie Monzon MD sent at 8/15/2018 11:59 AM EDT -----  Regarding: RE: PT MEDS  Stop them and come in this afternoon we can give it in a shot  ----- Message -----  From: Mauro Garcia MA  Sent: 8/15/2018  10:45 AM  To: Reggie Monzon MD  Subject: FW: PT MEDS                                          ----- Message -----  From: Elle Cheema  Sent: 8/15/2018   8:52 AM  To: Mauro Garcia MA  Subject: PT MEDS                                          PT CALLED IN ASKING IF THE MEDS HE WAS PRESCRIBED AT  YESTERDAY VISIT WOULD CAUSE HIM TO VOMIT. HE STATED HE WAS UNABLE TO KEEP ANYTHING DOWN SINCE TAKING THEM

## 2018-08-16 RX ORDER — DIAZEPAM 5 MG/1
5 TABLET ORAL EVERY 8 HOURS PRN
Qty: 30 TABLET | Refills: 0
Start: 2018-08-16 | End: 2018-08-17

## 2018-08-16 NOTE — PROGRESS NOTES
"Subjective   Christopher Raymond is a 62 y.o. male.     Patient complains one of his medications made him throw up he thinks it may be the tramadol but also was unsure about the steroid pack.  He did receive some relief from his sciatica but it seems to be returning.         The following portions of the patient's history were reviewed and updated as appropriate: allergies, current medications, past social history and problem list.    Review of Systems   Constitutional: Negative for activity change and unexpected weight change.   HENT: Negative for congestion and sore throat.    Respiratory: Negative for cough and shortness of breath.    Cardiovascular: Negative for chest pain, palpitations and leg swelling.   Gastrointestinal: Negative for diarrhea, nausea and vomiting.   Genitourinary: Negative for dysuria and hematuria.   Musculoskeletal: Positive for arthralgias and back pain. Negative for joint swelling.   Skin: Negative for color change and rash.   Allergic/Immunologic: Negative for environmental allergies and food allergies.   Neurological: Positive for numbness. Negative for syncope and headaches.       Objective   /88   Pulse 105   Resp 16   Ht 190.5 cm (75\")   SpO2 96%   BMI 28.50 kg/m²   Physical Exam   Constitutional: He appears well-developed and well-nourished.   HENT:   Head: Normocephalic and atraumatic.   Eyes: Pupils are equal, round, and reactive to light. Conjunctivae are normal.   Neck: Neck supple.   Cardiovascular: Normal rate and regular rhythm.    Pulmonary/Chest: Effort normal and breath sounds normal.   Abdominal: Soft. Bowel sounds are normal.   Musculoskeletal: He exhibits tenderness.   Radicular pain to the right hip and lateral thigh   Lymphadenopathy:     He has no cervical adenopathy.   Skin: Skin is warm and dry.   Nursing note and vitals reviewed.      Assessment/Plan   Problem List Items Addressed This Visit     None      Visit Diagnoses     Sciatica of right side    -  " Primary    Relevant Medications    triamcinolone acetonide (KENALOG-40) injection 40 mg (Completed)          New Medications Ordered This Visit   Medications   • ibuprofen (ADVIL,MOTRIN) 600 MG tablet     Sig: Take 1 tablet by mouth Every 6 (Six) Hours As Needed for Mild Pain .     Dispense:  50 tablet     Refill:  0   • triamcinolone acetonide (KENALOG-40) injection 40 mg   • diazePAM (VALIUM) 5 MG tablet     Sig: Take 1 tablet by mouth Every 8 (Eight) Hours As Needed for Anxiety or Muscle Spasms.     Dispense:  30 tablet     Refill:  0     Consider referral or further imaging if symptoms persist.

## 2018-08-17 ENCOUNTER — HOSPITAL ENCOUNTER (EMERGENCY)
Facility: HOSPITAL | Age: 63
Discharge: HOME | End: 2018-08-17
Attending: EMERGENCY MEDICINE | Admitting: EMERGENCY MEDICINE

## 2018-08-17 VITALS
HEART RATE: 104 BPM | SYSTOLIC BLOOD PRESSURE: 164 MMHG | TEMPERATURE: 98.4 F | DIASTOLIC BLOOD PRESSURE: 107 MMHG | OXYGEN SATURATION: 97 % | BODY MASS INDEX: 27.35 KG/M2 | HEIGHT: 75 IN | WEIGHT: 220 LBS | RESPIRATION RATE: 22 BRPM

## 2018-08-17 DIAGNOSIS — M54.31 SCIATICA OF RIGHT SIDE: Primary | ICD-10-CM

## 2018-08-17 PROCEDURE — 99283 EMERGENCY DEPT VISIT LOW MDM: CPT

## 2018-08-17 PROCEDURE — 63710000001 PREDNISONE PER 1 MG: Performed by: EMERGENCY MEDICINE

## 2018-08-17 RX ORDER — ONDANSETRON 4 MG/1
4 TABLET, ORALLY DISINTEGRATING ORAL EVERY 6 HOURS PRN
Qty: 20 TABLET | Refills: 0 | Status: SHIPPED | OUTPATIENT
Start: 2018-08-17 | End: 2018-08-22

## 2018-08-17 RX ORDER — OXYCODONE HYDROCHLORIDE AND ACETAMINOPHEN 5; 325 MG/1; MG/1
1 TABLET ORAL EVERY 4 HOURS PRN
Qty: 15 TABLET | Refills: 0 | Status: SHIPPED | OUTPATIENT
Start: 2018-08-17 | End: 2018-08-20 | Stop reason: SDUPTHER

## 2018-08-17 RX ORDER — PREDNISONE 20 MG/1
20 TABLET ORAL 3 TIMES DAILY
Qty: 15 TABLET | Refills: 0 | Status: SHIPPED | OUTPATIENT
Start: 2018-08-17 | End: 2018-08-29

## 2018-08-17 RX ORDER — OXYCODONE HYDROCHLORIDE AND ACETAMINOPHEN 5; 325 MG/1; MG/1
1 TABLET ORAL ONCE
Status: COMPLETED | OUTPATIENT
Start: 2018-08-17 | End: 2018-08-17

## 2018-08-17 RX ORDER — PREDNISONE 20 MG/1
60 TABLET ORAL ONCE
Status: COMPLETED | OUTPATIENT
Start: 2018-08-17 | End: 2018-08-17

## 2018-08-17 RX ORDER — ONDANSETRON 4 MG/1
4 TABLET, ORALLY DISINTEGRATING ORAL ONCE
Status: COMPLETED | OUTPATIENT
Start: 2018-08-17 | End: 2018-08-17

## 2018-08-17 RX ADMIN — OXYCODONE HYDROCHLORIDE AND ACETAMINOPHEN 1 TABLET: 5; 325 TABLET ORAL at 05:19

## 2018-08-17 RX ADMIN — PREDNISONE 60 MG: 20 TABLET ORAL at 05:19

## 2018-08-17 RX ADMIN — ONDANSETRON 4 MG: 4 TABLET, ORALLY DISINTEGRATING ORAL at 05:19

## 2018-08-17 NOTE — DISCHARGE INSTRUCTIONS
Patient is advised to take prednisone as prescribed, Percocet as prescribed, and takes Zofran to help alleviate any nausea that may be associated with the narcotic use.    Advised to follow-up with Dr. Monzon for repeat evaluation within the next week.

## 2018-08-17 NOTE — ED PROVIDER NOTES
Subjective   62-year-old male presents with a complaint of pain to the right lateral thigh.  He reports that suddenly began approximate 4 days earlier when he was attempting his socks on.  He has a known history of sciatica, in the past it primarily is affected the left side.  He presented to his primary care physician earlier this week and was given combination of muscle relaxers and pain medication.  However when he took the Ultram for pain if it helped the pain, but he experienced nausea, therefore this has subsequently been discontinued.  He is taking a combination of benzodiazepines and baclofen along with ibuprofen but has not experienced relief of symptoms.  Denies any bowel or urinary incontinence or retention.  No actual back pain.  No trauma to his back, no fever, no previous surgeries to his back.  No other aggravating, alleviating, or associated factors.        Lower Extremity Issue   Location:  Hip  Time since incident:  4 days  Injury: no    Hip location:  R hip  Pain details:     Quality:  Shooting    Radiates to:  R leg    Severity:  Moderate    Onset quality:  Sudden    Duration:  4 days    Timing:  Constant    Progression:  Waxing and waning  Chronicity:  Recurrent  Dislocation: no    Foreign body present:  No foreign bodies  Tetanus status:  Unknown  Prior injury to area:  No  Relieved by: ultram gave relief, but caused nausea, so was discontinued by PCP.    Worsened by:  Nothing  Ineffective treatments:  Immobilization, rest and NSAIDs  Associated symptoms: numbness    Associated symptoms: no back pain, no decreased ROM, no fatigue, no fever, no muscle weakness, no neck pain, no swelling and no tingling    Risk factors: concern for non-accidental trauma    Risk factors: no obesity        Review of Systems   Constitutional: Negative for chills, fatigue and fever.   HENT: Negative for congestion, ear pain, postnasal drip, sinus pressure and sore throat.    Eyes: Negative for pain, redness and  visual disturbance.   Respiratory: Negative for cough, chest tightness and shortness of breath.    Cardiovascular: Negative for chest pain, palpitations and leg swelling.   Gastrointestinal: Negative for abdominal pain, anal bleeding, blood in stool, diarrhea, nausea and vomiting.   Endocrine: Negative for polydipsia and polyuria.   Genitourinary: Negative for difficulty urinating, dysuria, frequency and urgency.   Musculoskeletal: Negative for arthralgias, back pain and neck pain.   Skin: Negative for pallor and rash.   Allergic/Immunologic: Negative for environmental allergies and immunocompromised state.   Neurological: Negative for dizziness, weakness and headaches.   Hematological: Negative for adenopathy.   Psychiatric/Behavioral: Negative for confusion, self-injury and suicidal ideas. The patient is not nervous/anxious.    All other systems reviewed and are negative.      Past Medical History:   Diagnosis Date   • Hematospermia    • Sciatica        Allergies   Allergen Reactions   • Penicillins    • Tramadol Hcl Nausea And Vomiting       Past Surgical History:   Procedure Laterality Date   • CATARACT EXTRACTION     • HERNIA REPAIR     • TONSILLECTOMY  1967       Family History   Problem Relation Age of Onset   • Hypotension Mother    • Stroke Father    • Hypertension Father        Social History     Social History   • Marital status:      Social History Main Topics   • Smoking status: Never Smoker   • Smokeless tobacco: Never Used   • Alcohol use 0.6 - 1.2 oz/week     1 - 2 Cans of beer per week      Comment: very rare   • Drug use: No   • Sexual activity: Defer     Other Topics Concern   • Not on file           Objective   Physical Exam   Constitutional: He is oriented to person, place, and time. He appears well-developed and well-nourished.  Non-toxic appearance. No distress.   HENT:   Head: Normocephalic and atraumatic.   Right Ear: External ear normal.   Left Ear: External ear normal.   Nose: Nose  normal.   Eyes: Pupils are equal, round, and reactive to light. EOM and lids are normal.   Neck: Normal range of motion. Neck supple. No tracheal deviation present.   Cardiovascular: Normal rate, regular rhythm and normal heart sounds.  Exam reveals no gallop, no friction rub and no decreased pulses.    No murmur heard.  Pulmonary/Chest: Effort normal and breath sounds normal. No respiratory distress. He has no decreased breath sounds. He has no wheezes. He has no rhonchi. He has no rales.   Abdominal: Soft. Normal appearance and bowel sounds are normal. There is no tenderness. There is no rebound and no guarding.   Musculoskeletal: Normal range of motion. He exhibits no deformity.   Lymphadenopathy:     He has no cervical adenopathy.   Neurological: He is alert and oriented to person, place, and time. He has normal strength. No cranial nerve deficit or sensory deficit.   Skin: Skin is warm and dry. No rash noted. He is not diaphoretic.   Psychiatric: He has a normal mood and affect. His speech is normal and behavior is normal. Judgment and thought content normal. Cognition and memory are normal.   Nursing note and vitals reviewed.      Procedures           ED Course                  MDM  Number of Diagnoses or Management Options  Sciatica of right side: new and requires workup  Diagnosis management comments: Patient will be treated as sciatica and will be given Percocet along with Zofran.    He'll be advised to discontinue the Valium.    We will be given a by mouth course of high-dose prednisone to be taken for the next 5 days.    Advised to follow-up with primary care physician for repeat evaluation in 1 week.       Amount and/or Complexity of Data Reviewed  Review and summarize past medical records: yes  Independent visualization of images, tracings, or specimens: yes    Patient Progress  Patient progress: stable        Final diagnoses:   Sciatica of right side            Sofie Gardner MD  08/17/18  0837

## 2018-08-18 ENCOUNTER — HOSPITAL ENCOUNTER (EMERGENCY)
Facility: HOSPITAL | Age: 63
Discharge: HOME | End: 2018-08-18
Attending: EMERGENCY MEDICINE | Admitting: EMERGENCY MEDICINE

## 2018-08-18 VITALS
HEART RATE: 99 BPM | DIASTOLIC BLOOD PRESSURE: 70 MMHG | OXYGEN SATURATION: 96 % | SYSTOLIC BLOOD PRESSURE: 140 MMHG | BODY MASS INDEX: 27.35 KG/M2 | RESPIRATION RATE: 20 BRPM | WEIGHT: 220 LBS | TEMPERATURE: 98.4 F | HEIGHT: 75 IN

## 2018-08-18 DIAGNOSIS — M54.16 LUMBAR RADICULOPATHY, ACUTE: Primary | ICD-10-CM

## 2018-08-18 PROCEDURE — 99283 EMERGENCY DEPT VISIT LOW MDM: CPT

## 2018-08-18 RX ORDER — OXYCODONE HYDROCHLORIDE AND ACETAMINOPHEN 5; 325 MG/1; MG/1
1 TABLET ORAL ONCE
Status: COMPLETED | OUTPATIENT
Start: 2018-08-18 | End: 2018-08-18

## 2018-08-18 RX ADMIN — OXYCODONE HYDROCHLORIDE AND ACETAMINOPHEN 1 TABLET: 5; 325 TABLET ORAL at 13:23

## 2018-08-18 NOTE — ED PROVIDER NOTES
Subjective   Pt has been having RLE radiculopathy for 6 days. Initially started on Ultram but not tolerated GI.  Seen here yesterday and started on Percocet and given 15 tablets by script. Pt states pain no worse and actually almost pain free now. Pt inquiring about more percocet before he runs out.  Has enough to last until Sunday evening and has an appointment with Dr. Monzon Monday morning. No loss of bowel or bladder control. No numbness of the groin or perirectal area.  No loss of strength of the lower extremity.  Pt also inquiring about getting MRI done today to speed things along.  Pain resolved right now after percocet 4 hours ago.   Sitting worsens pain, standing and laying down alleviates pain.         History provided by:  Patient   used: No    Back Pain   Pain location: right SI and buttock.  Quality:  Burning  Radiates to:  R posterior upper leg  Pain severity now: right now minimal pain but when meds wear off pain 8/10.  Pain is:  Same all the time  Onset quality:  Gradual  Duration:  6 days  Timing:  Constant  Progression:  Waxing and waning  Chronicity:  New  Context: not emotional stress, not falling, not jumping from heights, not MVA, not occupational injury, not pedestrian accident, not recent injury and not twisting    Relieved by:  Narcotics and lying down  Worsened by:  Sitting, twisting and bending  Ineffective treatments:  None tried  Associated symptoms: tingling    Associated symptoms: no abdominal pain, no bladder incontinence, no bowel incontinence, no chest pain, no dysuria, no fever, no headaches, no leg pain, no numbness, no paresthesias, no perianal numbness, no weakness and no weight loss    Risk factors: no hx of cancer, no hx of osteoporosis, no recent surgery and no steroid use        Review of Systems   Constitutional: Negative for chills, fever and weight loss.   Respiratory: Negative for chest tightness and shortness of breath.    Cardiovascular: Negative  for chest pain and palpitations.   Gastrointestinal: Negative for abdominal pain, bowel incontinence, nausea and vomiting.   Genitourinary: Negative for bladder incontinence, dysuria, frequency and urgency.   Musculoskeletal: Positive for back pain.   Neurological: Positive for tingling. Negative for weakness, numbness, headaches and paresthesias.   Hematological: Negative for adenopathy.   Psychiatric/Behavioral: Negative.    All other systems reviewed and are negative.      Past Medical History:   Diagnosis Date   • Hematospermia    • Sciatica        Allergies   Allergen Reactions   • Penicillins Unknown (See Comments)     As a child   • Tramadol Hcl Nausea And Vomiting       Past Surgical History:   Procedure Laterality Date   • CATARACT EXTRACTION     • HERNIA REPAIR     • TONSILLECTOMY  1967       Family History   Problem Relation Age of Onset   • Hypotension Mother    • Stroke Father    • Hypertension Father        Social History     Social History   • Marital status:      Social History Main Topics   • Smoking status: Never Smoker   • Smokeless tobacco: Never Used   • Alcohol use 0.6 - 1.2 oz/week     1 - 2 Cans of beer per week      Comment: very rare   • Drug use: No   • Sexual activity: Defer     Other Topics Concern   • Not on file           Objective   Physical Exam   Constitutional: He is oriented to person, place, and time. He appears well-developed and well-nourished.   HENT:   Head: Normocephalic and atraumatic.   Nose: Nose normal.   Eyes: Conjunctivae are normal. No scleral icterus.   Neck: Normal range of motion. No thyromegaly present.   Cardiovascular: Normal rate, regular rhythm and normal heart sounds.    Pulmonary/Chest: Effort normal and breath sounds normal. No respiratory distress. He has no wheezes. He has no rales. He exhibits no tenderness.   Abdominal: Soft. Bowel sounds are normal. He exhibits no distension. There is no tenderness.   Musculoskeletal: Normal range of motion.    TTP right SI region no rash.  No lesions. +SLR on right.  Strength 5/5 bilaterally.   Lymphadenopathy:     He has no cervical adenopathy.   Neurological: He is alert and oriented to person, place, and time. He has normal reflexes. He displays normal reflexes. No cranial nerve deficit. Coordination normal.   Skin: Skin is warm and dry. Capillary refill takes less than 2 seconds.   Psychiatric: He has a normal mood and affect. His behavior is normal. Judgment and thought content normal.   Nursing note and vitals reviewed.      Procedures           ED Course                  MDM  Number of Diagnoses or Management Options  Lumbar radiculopathy, acute: new and requires workup     Amount and/or Complexity of Data Reviewed  Review and summarize past medical records: yes  Discuss the patient with other providers: yes    Patient Progress  Patient progress: stable        Final diagnoses:   Lumbar radiculopathy, acute            Miguel Childress PA  08/18/18 [id number]

## 2018-08-20 ENCOUNTER — OFFICE VISIT (OUTPATIENT)
Dept: FAMILY MEDICINE CLINIC | Facility: CLINIC | Age: 63
End: 2018-08-20

## 2018-08-20 VITALS
HEART RATE: 76 BPM | BODY MASS INDEX: 28.35 KG/M2 | OXYGEN SATURATION: 97 % | DIASTOLIC BLOOD PRESSURE: 90 MMHG | RESPIRATION RATE: 18 BRPM | HEIGHT: 75 IN | WEIGHT: 228 LBS | SYSTOLIC BLOOD PRESSURE: 148 MMHG

## 2018-08-20 DIAGNOSIS — M54.16 LUMBAR RADICULOPATHY: Primary | ICD-10-CM

## 2018-08-20 PROCEDURE — 99213 OFFICE O/P EST LOW 20 MIN: CPT | Performed by: FAMILY MEDICINE

## 2018-08-20 RX ORDER — OXYCODONE HYDROCHLORIDE AND ACETAMINOPHEN 5; 325 MG/1; MG/1
1 TABLET ORAL EVERY 6 HOURS PRN
Qty: 15 TABLET | Refills: 0
Start: 2018-08-20 | End: 2018-08-22

## 2018-08-20 NOTE — PROGRESS NOTES
"Jenifer Raymond is a 62 y.o. male.     Sciatica   This is a new (2 ER visits this weekend) problem. The current episode started in the past 7 days. The problem occurs constantly. The problem has been gradually improving. Associated symptoms include numbness. Pertinent negatives include no abdominal pain, chest pain, congestion, coughing, fever, headaches, nausea, sore throat, vomiting or weakness. The symptoms are aggravated by twisting and bending. He has tried oral narcotics, heat and ice for the symptoms. The treatment provided moderate relief.        The following portions of the patient's history were reviewed and updated as appropriate: allergies, current medications, past social history and problem list.    Review of Systems   Constitutional: Negative for fever.   HENT: Negative for congestion and sore throat.    Respiratory: Negative for cough and shortness of breath.    Cardiovascular: Negative for chest pain and palpitations.   Gastrointestinal: Negative for abdominal pain, nausea and vomiting.   Genitourinary: Negative for dysuria and hematuria.   Musculoskeletal: Positive for back pain.   Neurological: Positive for numbness. Negative for weakness and headaches.   Psychiatric/Behavioral: Negative.        Objective   /90   Pulse 76   Resp 18   Ht 190.5 cm (75\")   Wt 103 kg (228 lb)   SpO2 97%   BMI 28.50 kg/m²   Physical Exam   Constitutional: He is oriented to person, place, and time. He appears well-developed and well-nourished. He is cooperative.   HENT:   Head: Normocephalic and atraumatic.   Eyes: Pupils are equal, round, and reactive to light. Conjunctivae are normal. No scleral icterus.   Neck: Neck supple. Carotid bruit is not present. No thyromegaly present.   Cardiovascular: Normal rate and regular rhythm.    Pulmonary/Chest: Effort normal and breath sounds normal.   Abdominal: There is no hepatosplenomegaly.   Musculoskeletal: Normal range of motion. He exhibits tenderness. "   Localizes the pain to the right lateral thigh numbness of the right anterior thigh reflexes slightly diminished on the right sensation otherwise intact peripheral pulses normal straight leg raise is equivocal   Neurological: He is alert and oriented to person, place, and time.   No focal deficits no lateralizing signs   Skin: Skin is warm and dry. No rash noted.   Psychiatric: He has a normal mood and affect. Cognition and memory are normal.   Nursing note and vitals reviewed.      Assessment/Plan   Problem List Items Addressed This Visit     None      Visit Diagnoses     Lumbar radiculopathy    -  Primary    Relevant Orders    MRI Lumbar Spine Without Contrast          No orders of the defined types were placed in this encounter.  Complete the course of prednisone, scheduled MRI will most likely need referral he has intractable pain is having difficulty sleeping well.  Follow up Friday.  The emergency room records were reviewed.

## 2018-08-21 ENCOUNTER — HOSPITAL ENCOUNTER (OUTPATIENT)
Dept: MRI IMAGING | Facility: HOSPITAL | Age: 63
Discharge: HOME | End: 2018-08-21
Attending: FAMILY MEDICINE | Admitting: FAMILY MEDICINE

## 2018-08-21 DIAGNOSIS — M54.16 LUMBAR RADICULOPATHY: ICD-10-CM

## 2018-08-21 PROCEDURE — 72148 MRI LUMBAR SPINE W/O DYE: CPT

## 2018-08-22 DIAGNOSIS — M48.061 FORAMINAL STENOSIS OF LUMBAR REGION: Primary | ICD-10-CM

## 2018-08-22 DIAGNOSIS — M54.16 LUMBAR RADICULAR PAIN: ICD-10-CM

## 2018-08-22 RX ORDER — OXYCODONE AND ACETAMINOPHEN 7.5; 325 MG/1; MG/1
1 TABLET ORAL EVERY 6 HOURS PRN
Qty: 12 TABLET | Refills: 0
Start: 2018-08-22 | End: 2018-08-23 | Stop reason: SDUPTHER

## 2018-08-23 ENCOUNTER — OFFICE VISIT (OUTPATIENT)
Dept: FAMILY MEDICINE CLINIC | Facility: CLINIC | Age: 63
End: 2018-08-23

## 2018-08-23 ENCOUNTER — TELEPHONE (OUTPATIENT)
Dept: FAMILY MEDICINE CLINIC | Facility: CLINIC | Age: 63
End: 2018-08-23

## 2018-08-23 VITALS
RESPIRATION RATE: 16 BRPM | HEIGHT: 75 IN | DIASTOLIC BLOOD PRESSURE: 90 MMHG | WEIGHT: 227 LBS | OXYGEN SATURATION: 98 % | HEART RATE: 104 BPM | BODY MASS INDEX: 28.23 KG/M2 | SYSTOLIC BLOOD PRESSURE: 146 MMHG

## 2018-08-23 DIAGNOSIS — M54.31 SCIATICA OF RIGHT SIDE: Primary | ICD-10-CM

## 2018-08-23 DIAGNOSIS — M48.061 FORAMINAL STENOSIS OF LUMBAR REGION: ICD-10-CM

## 2018-08-23 PROCEDURE — 99213 OFFICE O/P EST LOW 20 MIN: CPT | Performed by: FAMILY MEDICINE

## 2018-08-23 RX ORDER — BACLOFEN 10 MG/1
10 TABLET ORAL 3 TIMES DAILY
Qty: 30 TABLET | Refills: 0 | Status: SHIPPED | OUTPATIENT
Start: 2018-08-23 | End: 2018-08-28 | Stop reason: SDUPTHER

## 2018-08-23 RX ORDER — OXYCODONE AND ACETAMINOPHEN 7.5; 325 MG/1; MG/1
1 TABLET ORAL EVERY 6 HOURS PRN
Qty: 12 TABLET | Refills: 0
Start: 2018-08-23 | End: 2018-08-28 | Stop reason: SDUPTHER

## 2018-08-23 NOTE — TELEPHONE ENCOUNTER
Called informed ptMacrina Garcia  A    ----- Message from Reggie Monzon MD sent at 8/22/2018  4:30 PM EDT -----  Regarding: RE: PLEASE  Contact: [phone]  Make appt for monday  ----- Message -----  From: Mauro Garcia MA  Sent: 8/22/2018   2:04 PM  To: Reggie Monzon MD  Subject: FW: PLEASE                                           ----- Message -----  From: Keke Adams  Sent: 8/22/2018   1:51 PM  To: Mauro Garcia MA  Subject: PLEASE                                           HE WILL NOT BE SET UP FOR A REFERRAL FOR AT LEAST A WEEK. CAN DR. MONZON GIVE HIM ANOTHER RX.     PLEASE CALL

## 2018-08-23 NOTE — PROGRESS NOTES
"Subjective   Christopher Raymond is a 62 y.o. male.     Jimy is doing some better still a lot of discomfort in his right anterior thigh if he is late on his pain medication he is now sleeping.  He has an appointment with neurosurgery next Wednesday and needs an eye pain medicines are doing for a few more days according to his calculation he'll run out on Sunday.      Sciatica   This is a new (2 ER visits this weekend) problem. The current episode started in the past 7 days. The problem occurs constantly. The problem has been gradually improving. Associated symptoms include arthralgias, myalgias and numbness. Pertinent negatives include no abdominal pain, chest pain, congestion, coughing, fever, headaches, nausea, sore throat, vomiting or weakness. The symptoms are aggravated by twisting and bending. He has tried oral narcotics, heat and ice for the symptoms. The treatment provided moderate relief.        The following portions of the patient's history were reviewed and updated as appropriate: allergies, current medications, past social history and problem list.    Review of Systems   Constitutional: Negative for fever.   HENT: Negative for congestion and sore throat.    Respiratory: Negative for cough.    Cardiovascular: Negative for chest pain.   Gastrointestinal: Negative for abdominal pain, nausea and vomiting.   Genitourinary: Negative for dysuria and hematuria.   Musculoskeletal: Positive for arthralgias, back pain and myalgias.   Neurological: Positive for numbness. Negative for weakness and headaches.   Psychiatric/Behavioral:        Obsessing about his back problem       Objective   /90   Pulse 104   Resp 16   Ht 190.5 cm (75\")   Wt 103 kg (227 lb)   SpO2 98%   BMI 28.37 kg/m²   Physical Exam   Constitutional: He is oriented to person, place, and time. He appears well-developed and well-nourished. He is cooperative.   HENT:   Head: Normocephalic.   Right Ear: External ear normal.   Left Ear: External ear " normal.   Nose: Nose normal.   Mouth/Throat: Oropharynx is clear and moist.   Eyes: Pupils are equal, round, and reactive to light. Conjunctivae are normal. No scleral icterus.   Neck: Neck supple. Carotid bruit is not present. No thyromegaly present.   Cardiovascular: Normal rate and regular rhythm.    Pulmonary/Chest: Effort normal and breath sounds normal.   Abdominal: There is no hepatosplenomegaly.   Musculoskeletal: Normal range of motion. He exhibits tenderness.   Neurological: He is alert and oriented to person, place, and time.   Straight leg raise is equivocal peripheral pulses are normal reflexes are equal in sensation little diminished in the right anterior thigh   Skin: Skin is warm and dry. No rash noted.   Psychiatric: He has a normal mood and affect. Cognition and memory are normal.   Nursing note and vitals reviewed.      Assessment/Plan   Problem List Items Addressed This Visit     None      Visit Diagnoses     Sciatica of right side    -  Primary    Foraminal stenosis of lumbar region              New Medications Ordered This Visit   Medications   • baclofen (LIORESAL) 10 MG tablet     Sig: Take 1 tablet by mouth 3 (Three) Times a Day.     Dispense:  30 tablet     Refill:  0   • oxyCODONE-acetaminophen (PERCOCET) 7.5-325 MG per tablet     Sig: Take 1 tablet by mouth Every 6 (Six) Hours As Needed for Severe Pain .     Dispense:  12 tablet     Refill:  0     Do not fill rx till Sunday do not take more often than every 6 hours.

## 2018-08-28 ENCOUNTER — DOCUMENTATION (OUTPATIENT)
Dept: NEUROSURGERY | Facility: CLINIC | Age: 63
End: 2018-08-28

## 2018-08-28 ENCOUNTER — OFFICE VISIT (OUTPATIENT)
Dept: FAMILY MEDICINE CLINIC | Facility: CLINIC | Age: 63
End: 2018-08-28

## 2018-08-28 VITALS
SYSTOLIC BLOOD PRESSURE: 154 MMHG | OXYGEN SATURATION: 97 % | HEIGHT: 75 IN | BODY MASS INDEX: 28.23 KG/M2 | RESPIRATION RATE: 16 BRPM | HEART RATE: 89 BPM | DIASTOLIC BLOOD PRESSURE: 88 MMHG | WEIGHT: 227 LBS

## 2018-08-28 DIAGNOSIS — M54.31 SCIATICA OF RIGHT SIDE: Primary | ICD-10-CM

## 2018-08-28 PROCEDURE — 99213 OFFICE O/P EST LOW 20 MIN: CPT | Performed by: FAMILY MEDICINE

## 2018-08-28 RX ORDER — OXYCODONE AND ACETAMINOPHEN 7.5; 325 MG/1; MG/1
1 TABLET ORAL EVERY 6 HOURS PRN
Qty: 12 TABLET | Refills: 0
Start: 2018-08-28 | End: 2018-08-29 | Stop reason: DRUGHIGH

## 2018-08-28 RX ORDER — IBUPROFEN 600 MG/1
600 TABLET ORAL EVERY 6 HOURS PRN
Qty: 50 TABLET | Refills: 1 | Status: SHIPPED | OUTPATIENT
Start: 2018-08-28 | End: 2018-12-06

## 2018-08-28 RX ORDER — BACLOFEN 10 MG/1
10 TABLET ORAL 3 TIMES DAILY
Qty: 30 TABLET | Refills: 0 | Status: SHIPPED | OUTPATIENT
Start: 2018-08-28 | End: 2018-12-06

## 2018-08-28 NOTE — PROGRESS NOTES
Patient called and wanted to know if Dr. Puente would give him Percocet tomorrow.  I explained that we do not give out pain medication unless surgery is indicated.  He said that he would make an apt with his PCP since he has been providing this for him.

## 2018-08-29 ENCOUNTER — OFFICE VISIT (OUTPATIENT)
Dept: NEUROSURGERY | Facility: CLINIC | Age: 63
End: 2018-08-29

## 2018-08-29 ENCOUNTER — DOCUMENTATION (OUTPATIENT)
Dept: NEUROSURGERY | Facility: CLINIC | Age: 63
End: 2018-08-29

## 2018-08-29 VITALS
SYSTOLIC BLOOD PRESSURE: 175 MMHG | WEIGHT: 225.8 LBS | DIASTOLIC BLOOD PRESSURE: 98 MMHG | BODY MASS INDEX: 27.5 KG/M2 | HEIGHT: 76 IN | TEMPERATURE: 97.5 F

## 2018-08-29 DIAGNOSIS — M51.36 DDD (DEGENERATIVE DISC DISEASE), LUMBAR: Primary | ICD-10-CM

## 2018-08-29 DIAGNOSIS — M79.604 RIGHT LEG PAIN: ICD-10-CM

## 2018-08-29 PROCEDURE — 99243 OFF/OP CNSLTJ NEW/EST LOW 30: CPT | Performed by: NEUROLOGICAL SURGERY

## 2018-08-29 RX ORDER — OXYCODONE HYDROCHLORIDE AND ACETAMINOPHEN 5; 325 MG/1; MG/1
1 TABLET ORAL EVERY 6 HOURS PRN
Qty: 45 TABLET | Refills: 0 | Status: SHIPPED | OUTPATIENT
Start: 2018-08-29 | End: 2018-09-06

## 2018-08-29 RX ORDER — GABAPENTIN 300 MG/1
300 CAPSULE ORAL 3 TIMES DAILY
Qty: 90 CAPSULE | Refills: 2 | OUTPATIENT
Start: 2018-08-29 | End: 2018-12-06

## 2018-08-29 NOTE — PROGRESS NOTES
Subjective     Chief Complaint: Right hip pain    Patient ID: Christopher Raymond is a 62 y.o. male seen for consultation today at the request of  Reggie Monzon MD    Neurologic Problem   The patient's primary symptoms include focal sensory loss and focal weakness. The patient's pertinent negatives include no altered mental status, clumsiness, loss of balance, memory loss, near-syncope, slurred speech, syncope, visual change or weakness. This is a recurrent problem. The current episode started 1 to 4 weeks ago. The neurological problem developed suddenly. The problem has been gradually worsening since onset. There was lower extremity focality noted. Associated symptoms include back pain. Pertinent negatives include no abdominal pain, auditory change, aura, bladder incontinence, bowel incontinence, chest pain, confusion, diaphoresis, dizziness, fatigue, fever, headaches, light-headedness, nausea, neck pain, palpitations, shortness of breath, vertigo or vomiting. Past treatments include medication, position change, sleep and walking. The treatment provided moderate relief.       This is a 62-year-old man with a 2 week history of severe right hip pain.  He describes the pain is extending from his right trochanter down the lateral aspect of his thigh.  It occasionally extends below the knee on the medial aspect of the lower leg, but the stabbing, lancinating pain in the right trochanteric hip area is his largest area of concern.    The following portions of the patient's history were reviewed and updated as appropriate: allergies, current medications, past family history, past medical history, past social history, past surgical history and problem list.    Family history:   Family History   Problem Relation Age of Onset   • Hypotension Mother    • Stroke Father    • Hypertension Father        Social history:   Social History     Social History   • Marital status:      Spouse name: N/A   • Number of children: N/A   •  "Years of education: N/A     Occupational History   • Not on file.     Social History Main Topics   • Smoking status: Never Smoker   • Smokeless tobacco: Never Used   • Alcohol use 0.6 - 1.2 oz/week     1 - 2 Cans of beer per week      Comment: very rare   • Drug use: No   • Sexual activity: Defer     Other Topics Concern   • Not on file     Social History Narrative   • No narrative on file       Review of Systems   Constitutional: Negative for diaphoresis, fatigue and fever.   Respiratory: Negative for shortness of breath.    Cardiovascular: Negative for chest pain, palpitations and near-syncope.   Gastrointestinal: Negative for abdominal pain, bowel incontinence, nausea and vomiting.   Genitourinary: Negative for bladder incontinence.   Musculoskeletal: Positive for back pain. Negative for neck pain.   Neurological: Positive for focal weakness and numbness (RLE). Negative for dizziness, vertigo, syncope, weakness, light-headedness, headaches and loss of balance.   Psychiatric/Behavioral: Negative for confusion and memory loss.   All other systems reviewed and are negative.      Objective   Blood pressure 175/98, temperature 97.5 °F (36.4 °C), temperature source Temporal Artery , height 191.8 cm (75.5\"), weight 102 kg (225 lb 12.8 oz).  Body mass index is 27.85 kg/m².    Physical Exam   Constitutional: He is oriented to person, place, and time. He appears well-developed.  Non-toxic appearance.   HENT:   Head: Normocephalic and atraumatic.   Right Ear: Hearing normal.   Left Ear: Hearing normal.   Nose: Nose normal.   Eyes: Pupils are equal, round, and reactive to light. Conjunctivae, EOM and lids are normal.   Neck: Normal range of motion. No JVD present.   Cardiovascular: Normal rate and regular rhythm.    Pulses:       Radial pulses are 2+ on the right side, and 2+ on the left side.   Pulmonary/Chest: Effort normal. No stridor. No respiratory distress. He has no wheezes.   Musculoskeletal:        Thoracic back: He " exhibits no tenderness, no swelling, no pain and no spasm.        Lumbar back: He exhibits no tenderness, no bony tenderness, no swelling, no pain and no spasm.   Muscle Group    L          R  Hip Flexor          5          5  Knee Extensor  5          5  ADF                   5          5  APF                   5          5  EHL                   5          5   Neurological: He is alert and oriented to person, place, and time. He has normal strength and normal reflexes. He displays normal reflexes. No cranial nerve deficit or sensory deficit. He exhibits normal muscle tone. He displays a negative Romberg sign. GCS eye subscore is 4. GCS verbal subscore is 5. GCS motor subscore is 6.   Reflex Scores:       Tricep reflexes are 2+ on the right side and 2+ on the left side.       Bicep reflexes are 2+ on the right side and 2+ on the left side.       Brachioradialis reflexes are 2+ on the right side and 2+ on the left side.       Patellar reflexes are 2+ on the right side and 2+ on the left side.       Achilles reflexes are 2+ on the right side and 2+ on the left side.  Skin: Skin is warm and dry. No rash noted. No erythema.   Psychiatric: He has a normal mood and affect. His behavior is normal. Judgment and thought content normal.   Nursing note and vitals reviewed.        Assessment/Plan     Independent Review of Radiographic Studies:      Available for my review is a MRI of the lumbar spine dated 8/21/18.  This demonstrates diffuse moderate to severe degenerative disc disease from L1 to the sacrum.  There are multifocal areas of moderate to severe neural foraminal stenosis.  On the right side at L2-3 and L3 4, there is moderate to severe neural foraminal stenosis, however there are no large extruded disc fragments or any other clear evidence for objective neural element compression.    Medical Decision Making:      This is a 62-year-old man with a 2 week history of right-sided hip pain.  Clinically, is within the  realm of possibility that this could be a right-sided L4 radiculopathy, however I'm really not impressed on his MRI as far as a structural etiology for his hip pain.  On the other hand, the range of motion of his hip is restricted, but he has no tenderness over his trochanteric bursa, and he does not have pain with external rotation of the hip.    I started him on some Neurontin and physical therapy.  I would like to follow up with him in a few weeks to determine what sort of response she's having to these conservative treatment modalities.  If his pain is not better, then I would recommend performing a myelogram and possibly a selective intraforaminal injection to help elucidate the potential structural cause of his hip pain.    Christopher was seen today for back pain and leg pain.    Diagnoses and all orders for this visit:    DDD (degenerative disc disease), lumbar  -     Ambulatory Referral to Physical Therapy Evaluate and treat  -     gabapentin (NEURONTIN) 300 MG capsule; Take 1 capsule by mouth 3 (Three) Times a Day.  -     oxyCODONE-acetaminophen (PERCOCET) 5-325 MG per tablet; Take 1 tablet by mouth Every 6 (Six) Hours As Needed for Moderate Pain .    Right leg pain  -     Ambulatory Referral to Physical Therapy Evaluate and treat  -     gabapentin (NEURONTIN) 300 MG capsule; Take 1 capsule by mouth 3 (Three) Times a Day.  -     oxyCODONE-acetaminophen (PERCOCET) 5-325 MG per tablet; Take 1 tablet by mouth Every 6 (Six) Hours As Needed for Moderate Pain .        Return in about 2 weeks (around 9/12/2018).           This document signed by JUAN Puente MD August 29, 2018 10:59 AM

## 2018-08-29 NOTE — PROGRESS NOTES
"Jenifer Raymond is a 62 y.o. male.     Sciatica   This is a new problem. The current episode started 1 to 4 weeks ago. The problem occurs constantly. The problem has been unchanged. Associated symptoms include myalgias and numbness. Pertinent negatives include no chest pain, chills, congestion, coughing, fatigue, fever, nausea, sore throat, vomiting or weakness. The symptoms are aggravated by bending (laying flat in bed too uncomfortable). He has tried oral narcotics, NSAIDs, heat and ice for the symptoms. The treatment provided moderate relief.        The following portions of the patient's history were reviewed and updated as appropriate: allergies, current medications, past social history and problem list.    Review of Systems   Constitutional: Positive for activity change. Negative for chills, fatigue and fever.   HENT: Negative for congestion and sore throat.    Respiratory: Negative for cough and shortness of breath.    Cardiovascular: Negative for chest pain and palpitations.   Gastrointestinal: Negative for diarrhea, nausea and vomiting.   Genitourinary: Negative for dysuria and hematuria.   Musculoskeletal: Positive for back pain and myalgias.   Neurological: Positive for numbness. Negative for dizziness and weakness.   Psychiatric/Behavioral: Positive for sleep disturbance.       Objective   /88   Pulse 89   Resp 16   Ht 190.5 cm (75\")   Wt 103 kg (227 lb)   SpO2 97%   BMI 28.37 kg/m²   Physical Exam   Constitutional: He is oriented to person, place, and time. He appears well-developed and well-nourished. He is cooperative.   HENT:   Head: Normocephalic and atraumatic.   Eyes: Pupils are equal, round, and reactive to light. Conjunctivae are normal. No scleral icterus.   Neck: Neck supple. Carotid bruit is not present. No thyromegaly present.   Cardiovascular: Normal rate and regular rhythm.    Pulmonary/Chest: Effort normal and breath sounds normal.   Abdominal: There is no " hepatosplenomegaly.   Musculoskeletal: Normal range of motion. He exhibits tenderness.   Radicular signs RLE   Neurological: He is alert and oriented to person, place, and time.   No focal deficits no lateralizing signs   Skin: Skin is warm and dry. No rash noted.   Psychiatric: He has a normal mood and affect. Cognition and memory are normal.   Nursing note and vitals reviewed.      Assessment/Plan   Problem List Items Addressed This Visit     None      Visit Diagnoses     Sciatica of right side    -  Primary          New Medications Ordered This Visit   Medications   • baclofen (LIORESAL) 10 MG tablet     Sig: Take 1 tablet by mouth 3 (Three) Times a Day.     Dispense:  30 tablet     Refill:  0   • ibuprofen (ADVIL,MOTRIN) 600 MG tablet     Sig: Take 1 tablet by mouth Every 6 (Six) Hours As Needed for Mild Pain .     Dispense:  50 tablet     Refill:  1   • oxyCODONE-acetaminophen (PERCOCET) 7.5-325 MG per tablet     Sig: Take 1 tablet by mouth Every 6 (Six) Hours As Needed for Severe Pain .     Dispense:  12 tablet     Refill:  0       Discussed proper use of pain meds and he will see NS in am.

## 2018-09-06 ENCOUNTER — OFFICE VISIT (OUTPATIENT)
Dept: ORTHOPEDIC SURGERY | Facility: CLINIC | Age: 63
End: 2018-09-06

## 2018-09-06 VITALS — OXYGEN SATURATION: 98 % | WEIGHT: 224.87 LBS | HEART RATE: 103 BPM | BODY MASS INDEX: 27.38 KG/M2 | HEIGHT: 76 IN

## 2018-09-06 DIAGNOSIS — M25.561 CHRONIC PAIN OF RIGHT KNEE: ICD-10-CM

## 2018-09-06 DIAGNOSIS — G89.29 CHRONIC PAIN OF RIGHT KNEE: ICD-10-CM

## 2018-09-06 DIAGNOSIS — M17.11 PRIMARY OSTEOARTHRITIS OF RIGHT KNEE: Primary | ICD-10-CM

## 2018-09-06 PROCEDURE — 99214 OFFICE O/P EST MOD 30 MIN: CPT | Performed by: ORTHOPAEDIC SURGERY

## 2018-09-06 PROCEDURE — 20610 DRAIN/INJ JOINT/BURSA W/O US: CPT | Performed by: ORTHOPAEDIC SURGERY

## 2018-09-06 RX ORDER — LIDOCAINE HYDROCHLORIDE 10 MG/ML
3 INJECTION, SOLUTION INFILTRATION; PERINEURAL
Status: COMPLETED | OUTPATIENT
Start: 2018-09-06 | End: 2018-09-06

## 2018-09-06 RX ORDER — TRIAMCINOLONE ACETONIDE 40 MG/ML
80 INJECTION, SUSPENSION INTRA-ARTICULAR; INTRAMUSCULAR
Status: COMPLETED | OUTPATIENT
Start: 2018-09-06 | End: 2018-09-06

## 2018-09-06 RX ORDER — BUPIVACAINE HYDROCHLORIDE 2.5 MG/ML
3 INJECTION, SOLUTION INFILTRATION; PERINEURAL
Status: COMPLETED | OUTPATIENT
Start: 2018-09-06 | End: 2018-09-06

## 2018-09-06 RX ADMIN — TRIAMCINOLONE ACETONIDE 80 MG: 40 INJECTION, SUSPENSION INTRA-ARTICULAR; INTRAMUSCULAR at 08:53

## 2018-09-06 RX ADMIN — LIDOCAINE HYDROCHLORIDE 3 ML: 10 INJECTION, SOLUTION INFILTRATION; PERINEURAL at 08:53

## 2018-09-06 RX ADMIN — BUPIVACAINE HYDROCHLORIDE 3 ML: 2.5 INJECTION, SOLUTION INFILTRATION; PERINEURAL at 08:53

## 2018-09-06 NOTE — PROGRESS NOTES
Procedure   Large Joint Arthrocentesis  Date/Time: 9/6/2018 8:53 AM  Consent given by: patient  Site marked: site marked  Timeout: Immediately prior to procedure a time out was called to verify the correct patient, procedure, equipment, support staff and site/side marked as required   Supporting Documentation  Indications: pain   Procedure Details  Location: knee - R knee  Preparation: Patient was prepped and draped in the usual sterile fashion  Needle size: 22 G  Approach: anterolateral  Medications administered: 3 mL lidocaine 1 %; 80 mg triamcinolone acetonide 40 MG/ML; 3 mL bupivacaine 0.25 %  Patient tolerance: patient tolerated the procedure well with no immediate complications

## 2018-09-12 ENCOUNTER — OFFICE VISIT (OUTPATIENT)
Dept: NEUROSURGERY | Facility: CLINIC | Age: 63
End: 2018-09-12

## 2018-09-12 ENCOUNTER — APPOINTMENT (OUTPATIENT)
Dept: PHYSICAL THERAPY | Facility: HOSPITAL | Age: 63
End: 2018-09-12

## 2018-09-12 VITALS
TEMPERATURE: 98.2 F | BODY MASS INDEX: 27.18 KG/M2 | DIASTOLIC BLOOD PRESSURE: 90 MMHG | SYSTOLIC BLOOD PRESSURE: 140 MMHG | HEIGHT: 76 IN | WEIGHT: 223.2 LBS

## 2018-09-12 DIAGNOSIS — M51.36 DDD (DEGENERATIVE DISC DISEASE), LUMBAR: Primary | ICD-10-CM

## 2018-09-12 DIAGNOSIS — M79.604 RIGHT LEG PAIN: ICD-10-CM

## 2018-09-12 PROCEDURE — 99213 OFFICE O/P EST LOW 20 MIN: CPT | Performed by: NEUROLOGICAL SURGERY

## 2018-09-12 NOTE — PROGRESS NOTES
Subjective     Chief Complaint: Right hip and leg pain    Patient ID: Christopher Raymond is a 62 y.o. male is here today for follow-up.    Neurologic Problem   The patient's primary symptoms include focal sensory loss and focal weakness. The patient's pertinent negatives include no altered mental status, clumsiness, loss of balance, memory loss, near-syncope, slurred speech, syncope, visual change or weakness. This is a recurrent problem. The current episode started 1 to 4 weeks ago. The neurological problem developed suddenly. The problem has been gradually improving since onset. There was lower extremity focality noted. Pertinent negatives include no abdominal pain, auditory change, aura, back pain, bladder incontinence, bowel incontinence, chest pain, confusion, diaphoresis, dizziness, fatigue, fever, headaches, light-headedness, nausea, neck pain, palpitations, shortness of breath, vertigo or vomiting. Past treatments include medication, position change, sleep and walking. The treatment provided significant relief.       This is a 62-year-old man who I saw in consultation 2 weeks ago for new onset right hip and leg pain.  I recommended physical therapy.  I also gave him my prescription for some Neurontin.  He completed 1 physical therapy session and reports that his pain is significantly improved since starting his Neurontin and doing some home stretching exercises.    The following portions of the patient's history were reviewed and updated as appropriate: allergies, current medications, past family history, past medical history, past social history, past surgical history and problem list.    Family history:   Family History   Problem Relation Age of Onset   • Hypotension Mother    • Stroke Father    • Hypertension Father        Social history:   Social History     Social History   • Marital status:      Spouse name: N/A   • Number of children: N/A   • Years of education: N/A     Occupational History   • Not  on file.     Social History Main Topics   • Smoking status: Never Smoker   • Smokeless tobacco: Never Used   • Alcohol use 0.6 - 1.2 oz/week     1 - 2 Cans of beer per week      Comment: very rare   • Drug use: No   • Sexual activity: Defer     Other Topics Concern   • Not on file     Social History Narrative   • No narrative on file       Review of Systems   Constitutional: Negative for activity change, appetite change, chills, diaphoresis, fatigue, fever and unexpected weight change.   HENT: Negative for congestion, dental problem, drooling, ear discharge, ear pain, facial swelling, hearing loss, mouth sores, nosebleeds, postnasal drip, rhinorrhea, sinus pressure, sneezing, sore throat, tinnitus, trouble swallowing and voice change.    Eyes: Negative for photophobia, pain, discharge, redness, itching and visual disturbance.   Respiratory: Negative for apnea, cough, choking, chest tightness, shortness of breath, wheezing and stridor.    Cardiovascular: Negative for chest pain, palpitations, leg swelling and near-syncope.   Gastrointestinal: Negative for abdominal distention, abdominal pain, anal bleeding, blood in stool, bowel incontinence, constipation, diarrhea, nausea, rectal pain and vomiting.   Endocrine: Negative for cold intolerance, heat intolerance, polydipsia, polyphagia and polyuria.   Genitourinary: Negative for bladder incontinence, decreased urine volume, difficulty urinating, dysuria, enuresis, flank pain, frequency, genital sores, hematuria and urgency.   Musculoskeletal: Positive for myalgias. Negative for arthralgias, back pain, gait problem, joint swelling, neck pain and neck stiffness.   Skin: Negative for color change, pallor, rash and wound.   Allergic/Immunologic: Negative for environmental allergies, food allergies and immunocompromised state.   Neurological: Positive for focal weakness. Negative for dizziness, vertigo, tremors, seizures, syncope, facial asymmetry, speech difficulty,  "weakness, light-headedness, numbness, headaches and loss of balance.   Hematological: Negative for adenopathy. Does not bruise/bleed easily.   Psychiatric/Behavioral: Negative for agitation, behavioral problems, confusion, decreased concentration, dysphoric mood, hallucinations, memory loss, self-injury, sleep disturbance and suicidal ideas. The patient is not nervous/anxious and is not hyperactive.    All other systems reviewed and are negative.      Objective   Blood pressure 140/90, temperature 98.2 °F (36.8 °C), temperature source Temporal Artery , height 191.8 cm (75.51\"), weight 101 kg (223 lb 3.2 oz).  Body mass index is 27.52 kg/m².    Physical Exam   Constitutional: He is oriented to person, place, and time. He appears well-developed.  Non-toxic appearance.   HENT:   Head: Normocephalic and atraumatic.   Right Ear: Hearing normal.   Left Ear: Hearing normal.   Nose: Nose normal.   Eyes: Pupils are equal, round, and reactive to light. Conjunctivae, EOM and lids are normal.   Neck: Normal range of motion. No JVD present.   Cardiovascular: Normal rate and regular rhythm.    Pulses:       Radial pulses are 2+ on the right side, and 2+ on the left side.   Pulmonary/Chest: Effort normal. No stridor. No respiratory distress. He has no wheezes.   Musculoskeletal:        Thoracic back: He exhibits no tenderness, no swelling, no pain and no spasm.        Lumbar back: He exhibits no tenderness, no bony tenderness, no swelling, no pain and no spasm.   Muscle Group    L          R  Hip Flexor          5          5  Knee Extensor  5          5  ADF                   5          5  APF                   5          5  EHL                   5          5   Neurological: He is alert and oriented to person, place, and time. He has normal strength and normal reflexes. He displays normal reflexes. No cranial nerve deficit or sensory deficit. He exhibits normal muscle tone. He displays a negative Romberg sign. GCS eye subscore is " 4. GCS verbal subscore is 5. GCS motor subscore is 6.   Reflex Scores:       Tricep reflexes are 2+ on the right side and 2+ on the left side.       Bicep reflexes are 2+ on the right side and 2+ on the left side.       Brachioradialis reflexes are 2+ on the right side and 2+ on the left side.       Patellar reflexes are 2+ on the right side and 2+ on the left side.       Achilles reflexes are 2+ on the right side and 2+ on the left side.  Skin: Skin is warm and dry. No rash noted. No erythema.   Psychiatric: He has a normal mood and affect. His behavior is normal. Judgment and thought content normal.   Nursing note and vitals reviewed.        Assessment/Plan     Independent Review of Radiographic Studies:      He has no new imaging for me to review    Medical Decision Making:      This is a 62-year-old man with right hip pain that is resolving with Neurontin and physical therapy.  I would like for him to complete his course of physical therapy, and he can follow-up with me on an as-needed basis for persistent her refractory pain.    Christopher was seen today for follow-up.    Diagnoses and all orders for this visit:    DDD (degenerative disc disease), lumbar    Right leg pain        Return if symptoms worsen or fail to improve.           This document signed by JUAN Puente MD September 12, 2018 11:55 AM

## 2018-12-06 ENCOUNTER — OFFICE VISIT (OUTPATIENT)
Dept: ORTHOPEDIC SURGERY | Facility: CLINIC | Age: 63
End: 2018-12-06

## 2018-12-06 VITALS — BODY MASS INDEX: 27.11 KG/M2 | HEART RATE: 99 BPM | OXYGEN SATURATION: 98 % | WEIGHT: 222.66 LBS | HEIGHT: 76 IN

## 2018-12-06 DIAGNOSIS — M25.561 MEDIAL KNEE PAIN, RIGHT: Primary | ICD-10-CM

## 2018-12-06 DIAGNOSIS — M17.11 PRIMARY OSTEOARTHRITIS OF RIGHT KNEE: ICD-10-CM

## 2018-12-06 PROCEDURE — 99213 OFFICE O/P EST LOW 20 MIN: CPT | Performed by: ORTHOPAEDIC SURGERY

## 2018-12-06 NOTE — PROGRESS NOTES
"Orthopaedic Clinic Note: Knee Established Patient    Chief Complaint   Patient presents with   • Follow-up     3 months - Primary osteoarthritis of right knee        HPI    It has been 3  month(s) since Mr. Raymond's last visit. He returns to clinic today for follow-up right knee osteoarthritis.  He received cortisone injection 3 months ago.  The injection provided some transient relief but he is continuing to have intermittent pain episodes with certain movements.  He states that he is having episodes of mechanical symptoms radial twist his knee and had sharp pain in the knee that lasts for sometimes hours sometimes days and sometimes weeks before it \"untwists and gets in the right position\".  He currently rates his pain a 1/10 on the pain scale, but when East twisting episodes occur it can increase to 45/10 on the pain scale.  He is currently ambulating with no assistive device.  Overall his symptoms remain unchanged.     Past Medical History:   Diagnosis Date   • Hematospermia    • Sciatica       Past Surgical History:   Procedure Laterality Date   • CATARACT EXTRACTION     • HERNIA REPAIR     • TONSILLECTOMY  1967      Family History   Problem Relation Age of Onset   • Hypotension Mother    • Stroke Father    • Hypertension Father      Social History     Socioeconomic History   • Marital status:      Spouse name: Not on file   • Number of children: Not on file   • Years of education: Not on file   • Highest education level: Not on file   Social Needs   • Financial resource strain: Not on file   • Food insecurity - worry: Not on file   • Food insecurity - inability: Not on file   • Transportation needs - medical: Not on file   • Transportation needs - non-medical: Not on file   Occupational History   • Not on file   Tobacco Use   • Smoking status: Never Smoker   • Smokeless tobacco: Never Used   Substance and Sexual Activity   • Alcohol use: Yes     Alcohol/week: 0.6 - 1.2 oz     Types: 1 - 2 Cans of beer per " "week     Comment: very rare   • Drug use: No   • Sexual activity: Defer   Other Topics Concern   • Not on file   Social History Narrative   • Not on file      Current Outpatient Medications on File Prior to Visit   Medication Sig Dispense Refill   • Ascorbic Acid (VITAMIN C PO) Take 1,000 mg by mouth Daily.     • aspirin 81 MG tablet Take 81 mg by mouth daily.     • Cholecalciferol (VITAMIN D) 2000 units tablet Take 2,000 Units by mouth Daily.     • Multiple Vitamins-Minerals (MULTIVITAMIN PO) Take  by mouth daily.     • [DISCONTINUED] baclofen (LIORESAL) 10 MG tablet Take 1 tablet by mouth 3 (Three) Times a Day. 30 tablet 0   • [DISCONTINUED] gabapentin (NEURONTIN) 300 MG capsule Take 1 capsule by mouth 3 (Three) Times a Day. 90 capsule 2   • [DISCONTINUED] ibuprofen (ADVIL,MOTRIN) 600 MG tablet Take 1 tablet by mouth Every 6 (Six) Hours As Needed for Mild Pain . 50 tablet 1     No current facility-administered medications on file prior to visit.       Allergies   Allergen Reactions   • Penicillins Unknown (See Comments)     As a child   • Tramadol Hcl Nausea And Vomiting        Review of Systems   Constitutional: Negative.    HENT: Negative.    Eyes: Negative.    Respiratory: Negative.    Cardiovascular: Negative.    Gastrointestinal: Negative.    Endocrine: Negative.    Genitourinary: Negative.    Musculoskeletal: Positive for arthralgias.   Skin: Negative.    Allergic/Immunologic: Negative.    Neurological: Negative.    Hematological: Negative.    Psychiatric/Behavioral: Negative.         Physical Exam  Pulse 99, height 191.8 cm (75.51\"), weight 101 kg (222 lb 10.6 oz), SpO2 98 %.    Body mass index is 27.46 kg/m².    GENERAL APPEARANCE: awake, alert, oriented, in no acute distress  LUNGS:  breathing nonlabored  EXTREMITIES: no clubbing, cyanosis  PERIPHERAL PULSES: palpable dorsalis pedis and posterior tibial pulses bilaterally.    GAIT:  Normal        ----------  Right Knee Exam:  ----------  ALIGNMENT: " neutral  ----------  RANGE OF MOTION:  Normal (0-120 degrees) with no extensor lag or flexion contracture  LIGAMENTOUS STABILITY:   stable to varus and valgus stress at terminal extension and 30 degrees without any evidence of laxity  ----------  STRENGTH:  KNEE FLEXION 5/5  KNEE EXTENSION  5/5  ANKLE DORSIFLEXION  5/5  ANKLE PLANTARFLEXION  5/5  ----------  PAIN WITH PALPATION:medial joint line  KNEE EFFUSION: no  PAIN WITH KNEE ROM: no  PATELLAR CREPITUS:  yes, painful and symptomatic  Painful medial Corwin's, negative lateral Corwin's  ----------  SENSATION TO LIGHT TOUCH:  DEEP PERONEAL/SUPERFICIAL PERONEAL/SURAL/SAPHENOUS/TIBIAL:    intact  ----------  EDEMA:  no  ERYTHEMA:    no  WOUNDS/INCISIONS:  no  _____________________________________________________________________  _____________________________________________________________________    RADIOGRAPHIC FINDINGS:   No new imaging today     Assessment/Plan:   Diagnosis Plan   1. Medial knee pain, right  MRI Knee Right Without Contrast   2. Primary osteoarthritis of right knee       Patient's episodes of mechanical symptoms are concerning for possible medial meniscal tear resulting in significant association of pain and decreased mobility.  I recommended obtaining an MRI to evaluate for medial meniscal tear.  I explained to him that he does have significant patellofemoral arthritis and moderate medial lateral knee arthritis.  The meniscal tear could be degenerative in nature, but given his mechanical symptoms, there is likely a mechanical component which would benefit from arthroscopy if found on MRI.  He is agreeable to proceeding with the MRI of the right knee.  We'll see him back after the MRI to discuss the results.      Doug Liu MD  12/06/18  8:06 AM

## 2018-12-21 ENCOUNTER — OFFICE VISIT (OUTPATIENT)
Dept: ORTHOPEDIC SURGERY | Facility: CLINIC | Age: 63
End: 2018-12-21

## 2018-12-21 VITALS — WEIGHT: 222 LBS | HEART RATE: 89 BPM | BODY MASS INDEX: 27.03 KG/M2 | HEIGHT: 76 IN | OXYGEN SATURATION: 98 %

## 2018-12-21 DIAGNOSIS — M17.11 PRIMARY OSTEOARTHRITIS OF RIGHT KNEE: Primary | ICD-10-CM

## 2018-12-21 PROCEDURE — 99212 OFFICE O/P EST SF 10 MIN: CPT | Performed by: ORTHOPAEDIC SURGERY

## 2018-12-21 NOTE — PROGRESS NOTES
Orthopaedic Clinic Note: Knee Established Patient    Chief Complaint   Patient presents with   • Right Knee - Follow-up     MRI study        HPI    It has been 2  week(s) since Mr. Raymond's last visit. He returns to clinic today for follow-up of MRI of right knee for evaluation of possible medial meniscal tear.  He rates his pain a 1/10 on the pain scale today and states his mechanical symptoms have become less frequent.  He is having occasional sharp pain to the medial knee but otherwise is doing better.  He is ambulating with no assistive device.  He denies fevers chills or constitutional symptoms.    Past Medical History:   Diagnosis Date   • Hematospermia    • Sciatica       Past Surgical History:   Procedure Laterality Date   • CATARACT EXTRACTION     • HERNIA REPAIR     • TONSILLECTOMY  1967      Family History   Problem Relation Age of Onset   • Hypotension Mother    • Stroke Father    • Hypertension Father      Social History     Socioeconomic History   • Marital status:      Spouse name: Not on file   • Number of children: Not on file   • Years of education: Not on file   • Highest education level: Not on file   Social Needs   • Financial resource strain: Not on file   • Food insecurity - worry: Not on file   • Food insecurity - inability: Not on file   • Transportation needs - medical: Not on file   • Transportation needs - non-medical: Not on file   Occupational History   • Not on file   Tobacco Use   • Smoking status: Never Smoker   • Smokeless tobacco: Never Used   Substance and Sexual Activity   • Alcohol use: Yes     Alcohol/week: 0.6 - 1.2 oz     Types: 1 - 2 Cans of beer per week     Comment: very rare   • Drug use: No   • Sexual activity: Defer   Other Topics Concern   • Not on file   Social History Narrative   • Not on file      Current Outpatient Medications on File Prior to Visit   Medication Sig Dispense Refill   • Ascorbic Acid (VITAMIN C PO) Take 1,000 mg by mouth Daily.     • aspirin 81  "MG tablet Take 81 mg by mouth daily.     • Cholecalciferol (VITAMIN D) 2000 units tablet Take 2,000 Units by mouth Daily.     • Multiple Vitamins-Minerals (MULTIVITAMIN PO) Take  by mouth daily.       No current facility-administered medications on file prior to visit.       Allergies   Allergen Reactions   • Penicillins Unknown (See Comments)     As a child   • Tramadol Hcl Nausea And Vomiting        Review of Systems   Constitutional: Negative.    HENT: Negative.    Eyes: Negative.    Respiratory: Negative.    Cardiovascular: Negative.    Gastrointestinal: Negative.    Endocrine: Negative.    Genitourinary: Negative.    Musculoskeletal: Positive for arthralgias and joint swelling.   Skin: Negative.    Allergic/Immunologic: Negative.    Neurological: Negative.    Hematological: Negative.    Psychiatric/Behavioral: Negative.         Physical Exam  Pulse 89, height 191.8 cm (75.51\"), weight 101 kg (222 lb), SpO2 98 %.    Body mass index is 27.37 kg/m².    GENERAL APPEARANCE: awake, alert, oriented, in no acute distress  LUNGS:  breathing nonlabored  EXTREMITIES: no clubbing, cyanosis  PERIPHERAL PULSES: palpable dorsalis pedis and posterior tibial pulses bilaterally.    GAIT:  Normal        ----------  Right Knee Exam:  ----------  ALIGNMENT: neutral  ----------  RANGE OF MOTION:  Normal (0-120 degrees) with no extensor lag or flexion contracture  LIGAMENTOUS STABILITY:   stable to varus and valgus stress at terminal extension and 30 degrees without any evidence of laxity  ----------  STRENGTH:  KNEE FLEXION 5/5  KNEE EXTENSION  5/5  ANKLE DORSIFLEXION  5/5  ANKLE PLANTARFLEXION  5/5  ----------  PAIN WITH PALPATION:medial joint line  KNEE EFFUSION: no  PAIN WITH KNEE ROM: no  PATELLAR CREPITUS:  yes, painful and symptomatic  Painful medial Corwin's, negative lateral Corwin's  ----------  SENSATION TO LIGHT TOUCH:  DEEP PERONEAL/SUPERFICIAL PERONEAL/SURAL/SAPHENOUS/TIBIAL:    intact  ----------  EDEMA:  " no  ERYTHEMA:    no  WOUNDS/INCISIONS:  no  _____________________________________________________________________  _____________________________________________________________________    RADIOGRAPHIC FINDINGS:   MRI from 12/14/18 was personally reviewed.  MRI shows evidence of severe tricompartmental arthritic changes with subchondral edema involving the medial compartment.  There are degenerative changes involving the medial meniscus.      Assessment/Plan:   Diagnosis Plan   1. Primary osteoarthritis of right knee       I discussed with the patient the results of his MRI.  Although he does have a meniscal tear, it is degenerative in nature and secondary to his advanced arthritis.  Given the subchondral edema in improvement in mechanical symptoms, recommended no arthroscopic intervention at this time.  The most definitive treatment would be total knee arthroplasty which be the surgery recommended at this time and if he wishes to pursue surgery.  Given his improvement, he wishes continued observation.  I believe this is reasonable.  He'll follow-up as needed.      Doug Liu MD  12/21/18  12:40 PM

## 2019-03-20 ENCOUNTER — OFFICE VISIT (OUTPATIENT)
Dept: CARDIOLOGY | Facility: CLINIC | Age: 64
End: 2019-03-20

## 2019-03-20 VITALS
HEART RATE: 95 BPM | HEIGHT: 75 IN | DIASTOLIC BLOOD PRESSURE: 90 MMHG | WEIGHT: 220 LBS | BODY MASS INDEX: 27.35 KG/M2 | SYSTOLIC BLOOD PRESSURE: 144 MMHG

## 2019-03-20 DIAGNOSIS — R09.89 LABILE HYPERTENSION: Primary | ICD-10-CM

## 2019-03-20 DIAGNOSIS — R06.09 DYSPNEA ON EXERTION: ICD-10-CM

## 2019-03-20 DIAGNOSIS — I45.2 BIFASCICULAR BLOCK: ICD-10-CM

## 2019-03-20 PROCEDURE — 99214 OFFICE O/P EST MOD 30 MIN: CPT | Performed by: INTERNAL MEDICINE

## 2019-03-20 RX ORDER — TELMISARTAN AND HYDROCHLORTHIAZIDE 40; 12.5 MG/1; MG/1
1 TABLET ORAL DAILY
Qty: 30 TABLET | Refills: 11 | Status: SHIPPED | OUTPATIENT
Start: 2019-03-20 | End: 2019-03-20 | Stop reason: ALTCHOICE

## 2019-03-20 RX ORDER — HYDROCHLOROTHIAZIDE 12.5 MG/1
12.5 CAPSULE, GELATIN COATED ORAL DAILY
Qty: 30 CAPSULE | Refills: 11 | Status: SHIPPED | OUTPATIENT
Start: 2019-03-20 | End: 2020-08-04

## 2019-03-20 RX ORDER — TELMISARTAN 40 MG/1
40 TABLET ORAL DAILY
Qty: 30 TABLET | Refills: 11 | Status: SHIPPED | OUTPATIENT
Start: 2019-03-20 | End: 2019-04-12 | Stop reason: ALTCHOICE

## 2019-03-20 NOTE — PROGRESS NOTES
"    Subjective:     Encounter Date:03/20/2019    Patient ID: Christopher Raymond is a 63 y.o.  white male, manager of a [employer], from [place], [place].     PHYSICIAN: Reggie Monzon MD  OPHTHALMOLOGIST:  Unknown    Chief Complaint:   Chief Complaint   Patient presents with   • Hyperlipidemia     Problem List:  1. Abnormal EKG with asymptomatic bifascicular block:  a. Abnormal EKG with chronic right bundle branch block with left anterior hemiblock.  b. Remote acceptable echocardiographic GXT, November 2007.  c. Residual class I symptoms with acceptable echocardiographic GXT.  2. Remote tonsillectomy, 1967.  3. Hypertension - probably essential, with recent labile hypertensive blood pressure readings, 2019  4. Mildly overweight, BMI 27.5.  5. Mild dyslipidemia.  6. Remote cataract surgery, 2016.        Allergies   Allergen Reactions   • Penicillins Unknown (See Comments)     As a child   • Tramadol Hcl Nausea And Vomiting       Current Outpatient Medications:   •  Ascorbic Acid (VITAMIN C PO), Take 1,000 mg by mouth Daily., Disp: , Rfl:   •  aspirin 81 MG tablet, Take 81 mg by mouth daily., Disp: , Rfl:   •  Cholecalciferol (VITAMIN D) 2000 units tablet, Take 2,000 Units by mouth Daily., Disp: , Rfl:   •  Multiple Vitamins-Minerals (MULTIVITAMIN PO), Take  by mouth daily., Disp: , Rfl:     History of Present Illness: Patient returns for scheduled annual followup.  He was seen twice in the [facility] ED in August 2018, both times for pain in the right lateral thigh with pain medication provided.  He is surprised to see that his blood pressure is elevated today and jokes that \"maybe it's because I've cut down on caffeine.\"  He has been trying to keep his weight stable and knows that he could lose a couple of pounds.  He donates blood on a regular basis, and his blood pressure is consistently 140/90.  He is agreeable to initiating medicine for his hypertension.  He states that he has been " "reading about diuretics and about ACEIs and ARBs.  He states that he is due to have blood drawn tomorrow for his yearly physical, and he will try to let us know when those results are available; he says his last labs are in [facility].  The patient says that he has been trying to be careful with everything, watching his diet, and exercising on a regular basis.  He notes that the only problem he has is consuming too much salt.  He plays table tennis \"aggressively\" and notes that he has noticed his resting pulse rate going lower with regular exercise.  He asks about the use of NSAIDs, and this is discussed with him; he is advised to use it sparingly.  The patient states that his father and his father's mother  of a stroke, so he wants to be careful with his blood pressure.  Patient otherwise denies chest pain, shortness of breath, PND, edema, palpitations, syncope or presyncope at this time.        ROS   Obtained and negative except as outlined in problem list and HPI.    Procedures       Objective:       Vitals:    19 0845 19 0846 19 0903   BP: (!) 152/105 (!) 147/101 144/90   BP Location: Left arm Left arm Left arm   Patient Position: Sitting Standing Sitting   Cuff Size:   Adult   Pulse: 90 95    Weight: 99.8 kg (220 lb) 99.8 kg (220 lb)    Height: 190.5 cm (75\") 190.5 cm (75\")      Body mass index is 27.5 kg/m².   Last weight:  219 lbs.    Physical Exam   Constitutional: He is oriented to person, place, and time. He appears well-developed and well-nourished.   Neck: No JVD present. Carotid bruit is not present. No thyromegaly present.   Cardiovascular: Regular rhythm, S1 normal and S2 normal. Exam reveals no gallop, no S3 and no friction rub.   Murmur heard.   Medium-pitched early systolic murmur is present with a grade of 1/6 at the lower left sternal border.  Pulses:       Dorsalis pedis pulses are 2+ on the right side, and 2+ on the left side.        Posterior tibial pulses are 2+ on the right " side, and 2+ on the left side.   Pulmonary/Chest: Effort normal and breath sounds normal. He has no wheezes. He has no rhonchi. He has no rales.   Abdominal: Soft. He exhibits no mass. There is no hepatosplenomegaly. There is no tenderness. There is no guarding.   Bowel sounds audible x4   Musculoskeletal: Normal range of motion. He exhibits no edema.   Lymphadenopathy:     He has no cervical adenopathy.   Neurological: He is alert and oriented to person, place, and time.   Skin: Skin is warm, dry and intact. No rash noted.   Vitals reviewed.        Lab Review:   Lab Results   Component Value Date    GLUCOSE 94 04/17/2017    BUN 15 07/13/2018    CREATININE 0.88 07/13/2018    EGFRIFNONA 88 07/13/2018    EGFRIFAFRI 107 07/13/2018    BCR 17.0 07/13/2018    CO2 28.0 07/13/2018    CALCIUM 8.9 07/13/2018    PROTENTOTREF 7.0 07/13/2018    ALBUMIN 4.29 07/13/2018    LABIL2 1.6 07/13/2018    AST 29 07/13/2018    ALT 32 07/13/2018 07/13/2018:  · Glucose - 101  · Sodium - 141  · Potassium - 3.7  · Chloride - 106    Lab Results   Component Value Date    WBC 4.99 03/20/2018    HGB 14.2 03/20/2018    HCT 43.5 03/20/2018    MCV 95.2 03/20/2018     03/20/2018       Lab Results   Component Value Date    TSH 0.732 03/20/2018       Lab Results   Component Value Date    CHOL 156 03/13/2017     Lab Results   Component Value Date    TRIG 131 03/20/2018    TRIG 145 03/13/2017    TRIG 113 02/15/2016     Lab Results   Component Value Date    HDL 33 (L) 03/20/2018    HDL 35 (L) 03/13/2017    HDL 28 (L) 02/15/2016   03/20/2018:  · Total cholesterol - 166  · LDL - 107  · PSA - 0.920  · Vitamin D - 36.6  · Hepatitis C virus antibody - 0.1        Assessment:       Overall continued acceptable course with no interim cardiopulmonary complaints with good functional status. We will defer additional diagnostic or therapeutic intervention from a cardiac perspective at this time, other than to initiate an antihypertensive medication.  He will  hopefully allow us to review his laboratory studies after they are drawn tomorrow.     Diagnosis Plan   1. Labile hypertension  See below   2. Bifascicular block  No new additional studies required or indicated   3. Dyspnea on exertion  No recurrent angina pectoris or CHF on current activity schedule; continue current treatment            Plan:         1. Patient to continue current medications and close follow up with the above providers other than to initiate Micardis/HCTZ 40/12.5 daily and update us in 2-3 weeks concerning his blood pressure readings.    2. Tentative cardiology follow up in March 2020, or patient may return sooner PRN.    Transcribed by Jennifer Muñoz for Dr. Jaden Doran at 9:00 AM on 03/20/2019    I, Jaden Doran MD, [facility], personally performed the services described in this documentation as scribed by the above named individual in my presence, and it is both accurate and complete. At 9:09 AM on 03/20/2019

## 2019-04-05 ENCOUNTER — OFFICE VISIT (OUTPATIENT)
Dept: FAMILY MEDICINE CLINIC | Facility: CLINIC | Age: 64
End: 2019-04-05

## 2019-04-05 VITALS
TEMPERATURE: 98.1 F | BODY MASS INDEX: 27.33 KG/M2 | OXYGEN SATURATION: 95 % | SYSTOLIC BLOOD PRESSURE: 126 MMHG | HEART RATE: 85 BPM | WEIGHT: 219.8 LBS | DIASTOLIC BLOOD PRESSURE: 80 MMHG | HEIGHT: 75 IN | RESPIRATION RATE: 16 BRPM

## 2019-04-05 DIAGNOSIS — Z00.00 WELL ADULT EXAM: Primary | ICD-10-CM

## 2019-04-05 DIAGNOSIS — M75.21 BICEPS TENDONITIS ON RIGHT: ICD-10-CM

## 2019-04-05 DIAGNOSIS — I10 ESSENTIAL HYPERTENSION: ICD-10-CM

## 2019-04-05 PROBLEM — R09.89 LABILE HYPERTENSION: Status: RESOLVED | Noted: 2018-03-14 | Resolved: 2019-04-05

## 2019-04-05 PROBLEM — E55.9 VITAMIN D DEFICIENCY: Status: RESOLVED | Noted: 2017-03-13 | Resolved: 2019-04-05

## 2019-04-05 PROBLEM — M54.50 LOW BACK PAIN: Status: RESOLVED | Noted: 2017-03-13 | Resolved: 2019-04-05

## 2019-04-05 PROCEDURE — 90471 IMMUNIZATION ADMIN: CPT | Performed by: FAMILY MEDICINE

## 2019-04-05 PROCEDURE — 90715 TDAP VACCINE 7 YRS/> IM: CPT | Performed by: FAMILY MEDICINE

## 2019-04-05 PROCEDURE — 99396 PREV VISIT EST AGE 40-64: CPT | Performed by: FAMILY MEDICINE

## 2019-04-05 PROCEDURE — 93000 ELECTROCARDIOGRAM COMPLETE: CPT | Performed by: FAMILY MEDICINE

## 2019-04-05 NOTE — PROGRESS NOTES
"Jenifer Raymond is a 63 y.o. male and is here for a comprehensive physical exam. The patient reports no problems.      The following portions of the patient's history were reviewed and updated as appropriate: allergies, current medications, past family history, past medical history, past social history, past surgical history and problem list.    Allergies   Allergen Reactions   • Penicillins Unknown (See Comments)     As a child   • Tramadol Hcl Nausea And Vomiting       Past Medical History:   Diagnosis Date   • Hematospermia    • Sciatica      Past Surgical History:   Procedure Laterality Date   • CATARACT EXTRACTION     • HERNIA REPAIR     • TONSILLECTOMY  1967     Family History   Problem Relation Age of Onset   • Hypotension Mother    • Stroke Father    • Hypertension Father      Social History     Socioeconomic History   • Marital status:      Spouse name: Not on file   • Number of children: Not on file   • Years of education: Not on file   • Highest education level: Not on file   Tobacco Use   • Smoking status: Never Smoker   • Smokeless tobacco: Never Used   Substance and Sexual Activity   • Alcohol use: Yes     Alcohol/week: 0.6 - 1.2 oz     Types: 1 - 2 Cans of beer per week     Comment: very rare   • Drug use: No   • Sexual activity: Defer         Objective     Review of Systems    /80   Pulse 85   Temp 98.1 °F (36.7 °C)   Resp 16   Ht 190.5 cm (75\")   Wt 99.7 kg (219 lb 12.8 oz)   SpO2 95%   BMI 27.47 kg/m²     Review of Systems   Constitutional: Negative for activity change and unexpected weight change.   HENT: Positive for postnasal drip. Negative for congestion and sore throat.    Eyes:        Eye exam yearly which is up-to-date   Respiratory: Negative for cough and shortness of breath.    Cardiovascular: Negative for chest pain, palpitations and leg swelling.   Gastrointestinal: Positive for constipation. Negative for diarrhea, nausea and vomiting.        Constipation " well controlled with MiraLAX and fiber   Genitourinary: Negative for dysuria and hematuria.        Nocturia x1   Musculoskeletal: Positive for arthralgias and myalgias. Negative for joint swelling.        Left toe pain improved  Back pain improved with exercise and stretching  In the right upper arm for over a year   Skin: Negative for color change and rash.        Followed by dermatology yearly   Allergic/Immunologic: Negative for environmental allergies and food allergies.   Neurological: Negative for syncope and headaches.   Hematological: Negative for adenopathy. Does not bruise/bleed easily.   Psychiatric/Behavioral: Negative for dysphoric mood and sleep disturbance. The patient is not nervous/anxious.            Physical Exam     Physical Exam   Constitutional: He is oriented to person, place, and time. He appears well-developed and well-nourished. He is cooperative.   HENT:   Head: Normocephalic.   Right Ear: External ear normal.   Left Ear: External ear normal.   Nose: Nose normal.   Mouth/Throat: Oropharynx is clear and moist.   Eyes: Conjunctivae are normal. Pupils are equal, round, and reactive to light. No scleral icterus.   Neck: Neck supple. No JVD present. Carotid bruit is not present. No thyromegaly present.   Cardiovascular: Normal rate, regular rhythm, normal heart sounds and intact distal pulses.   Pulmonary/Chest: Effort normal and breath sounds normal.   Abdominal: Soft. Bowel sounds are normal. He exhibits no mass. There is no hepatosplenomegaly.   Musculoskeletal: Normal range of motion. He exhibits tenderness. He exhibits no edema.   Tenderness along the right biceps tendon groove and along the triceps tendon this is lasted for more than a year   Lymphadenopathy:     He has no cervical adenopathy.   Neurological: He is alert and oriented to person, place, and time.   No focal deficits no lateralizing signs   Skin: Skin is warm and dry. No rash noted.   Psychiatric: He has a normal mood and  affect. His behavior is normal. Judgment and thought content normal. Cognition and memory are normal.   Nursing note and vitals reviewed.        Assessment/Plan   Healthy male exam.     1.   Patient Active Problem List   Diagnosis   • Bifascicular block   • Hyperlipidemia   • Abnormal EKG   • Pain of toe of left foot   • Essential hypertension     2. Patient Counseling:  --Nutrition: Stressed importance of moderation in sodium/caffeine intake, saturated fat and cholesterol, caloric balance, sufficient intake of fresh fruits, vegetables, fiber, calcium, iron, and 1 mg of folate supplement per day (for females capable of pregnancy).  --Discussed the issue of estrogen replacement, calcium supplement, and the daily use of baby aspirin.  --Exercise: Stressed the importance of regular exercise.   --Substance Abuse: Discussed cessation/primary prevention of tobacco, alcohol, or other drug use; driving or other dangerous activities under the influence; availability of treatment for abuse.    --Sexuality: Discussed sexually transmitted diseases, partner selection, use of condoms, avoidance of unintended pregnancy  and contraceptive alternatives.   --Injury prevention: Discussed safety belts, safety helmets, smoke detector, smoking near bedding or upholstery.   --Dental health: Discussed importance of regular tooth brushing, flossing, and dental visits.  --Immunizations reviewed.  --Discussed benefits of screening colonoscopy.  --After hours service discussed with patient    3. Discussed the patient's BMI with him.  The BMI is above average; BMI management plan is completed  4. Follow up in 6 months        ECG 12 Lead  Date/Time: 4/5/2019 12:07 PM  Performed by: Reggie Monzon MD  Authorized by: Reggie Monzon MD   Comparison: compared with previous ECG   Similar to previous ECG  Rhythm: sinus rhythm  Ectopy: unifocal PVCs  Rate: normal  BPM: 76  Conduction: right bundle branch block and left anterior fascicular  block  QRS axis: left  Other findings: non-specific ST-T wave changes    Clinical impression: abnormal EKG        See form, [facility] evaluation check on shingles vaccine at pharmacy and hepatitis A.         Reggie Monzon MD  04/05/2019  12:06 PM

## 2019-04-12 RX ORDER — TELMISARTAN 80 MG/1
80 TABLET ORAL DAILY
Qty: 30 TABLET | Refills: 11 | Status: SHIPPED | OUTPATIENT
Start: 2019-04-12 | End: 2019-06-19 | Stop reason: SDUPTHER

## 2019-06-05 ENCOUNTER — OFFICE VISIT (OUTPATIENT)
Dept: FAMILY MEDICINE CLINIC | Facility: CLINIC | Age: 64
End: 2019-06-05

## 2019-06-05 VITALS
HEIGHT: 75 IN | OXYGEN SATURATION: 97 % | WEIGHT: 230.2 LBS | RESPIRATION RATE: 16 BRPM | HEART RATE: 86 BPM | BODY MASS INDEX: 28.62 KG/M2 | DIASTOLIC BLOOD PRESSURE: 82 MMHG | SYSTOLIC BLOOD PRESSURE: 122 MMHG | TEMPERATURE: 97.9 F

## 2019-06-05 DIAGNOSIS — H92.02 OTALGIA OF LEFT EAR: Primary | ICD-10-CM

## 2019-06-05 PROCEDURE — 99213 OFFICE O/P EST LOW 20 MIN: CPT | Performed by: FAMILY MEDICINE

## 2019-06-05 NOTE — PROGRESS NOTES
"Subjective   Christopher Raymond is a 63 y.o. male seen today for Earache (LT EAR).     Earache    There is pain in the left ear. This is a new problem. Episode onset: 6 months  The problem occurs every few hours. The problem has been unchanged. There has been no fever. The pain is moderate (external and internal ). Associated symptoms include hearing loss. Pertinent negatives include no coughing, diarrhea, ear discharge, headaches, neck pain, rash, sore throat or vomiting. Associated symptoms comments: Tinnitus. He has tried nothing for the symptoms.        The following portions of the patient's history were reviewed and updated as appropriate: allergies, current medications, past social history and problem list.    Review of Systems   Constitutional: Negative for chills, fatigue and fever.   HENT: Positive for ear pain and hearing loss. Negative for ear discharge and sore throat.         Tinnitus    Eyes: Negative for pain and visual disturbance.   Respiratory: Negative for cough, shortness of breath and wheezing.    Cardiovascular: Negative for chest pain, palpitations and leg swelling.   Gastrointestinal: Negative for constipation, diarrhea, nausea and vomiting.   Endocrine: Negative for polydipsia, polyphagia and polyuria.   Genitourinary: Negative for dysuria and hematuria.   Musculoskeletal: Negative for arthralgias, joint swelling and neck pain.   Skin: Negative for color change and rash.   Allergic/Immunologic: Negative for environmental allergies and food allergies.   Neurological: Negative for syncope and headaches.   Hematological: Negative for adenopathy. Does not bruise/bleed easily.   Psychiatric/Behavioral: Negative for dysphoric mood and sleep disturbance. The patient is not nervous/anxious.        Objective   /82   Pulse 86   Temp 97.9 °F (36.6 °C)   Resp 16   Ht 190.5 cm (75\")   Wt 104 kg (230 lb 3.2 oz)   SpO2 97%   BMI 28.77 kg/m²   Physical Exam   Constitutional: He is oriented to " person, place, and time. He appears well-developed and well-nourished.   HENT:   Head: Normocephalic and atraumatic.   Right Ear: External ear normal.   Left Ear: External ear normal.   Nose: Nose normal.   Mouth/Throat: Oropharynx is clear and moist.   Eyes: Conjunctivae are normal. Pupils are equal, round, and reactive to light. No scleral icterus.   Neck: Neck supple. No thyromegaly present.   Cardiovascular: Normal rate, regular rhythm and normal heart sounds.   Pulmonary/Chest: Effort normal and breath sounds normal.   Musculoskeletal: Normal range of motion.   Neurological: He is alert and oriented to person, place, and time.   Skin: Skin is warm and dry. No rash noted.   Psychiatric: He has a normal mood and affect.   Nursing note and vitals reviewed.      Assessment/Plan   Problem List Items Addressed This Visit     None      Visit Diagnoses     Otalgia of left ear    -  Primary    Relevant Orders    Ambulatory Referral to ENT (Otolaryngology)        Continue an antihistamine avoid sleeping on the left ear.  Okay ENT evaluation.

## 2019-06-19 RX ORDER — TELMISARTAN 80 MG/1
80 TABLET ORAL DAILY
Qty: 90 TABLET | Refills: 3 | Status: SHIPPED | OUTPATIENT
Start: 2019-06-19 | End: 2019-06-21 | Stop reason: DRUGHIGH

## 2019-06-21 RX ORDER — TELMISARTAN 40 MG/1
40 TABLET ORAL DAILY
Qty: 90 TABLET | Refills: 3 | Status: SHIPPED | OUTPATIENT
Start: 2019-06-21 | End: 2019-06-21 | Stop reason: SDUPTHER

## 2019-06-21 RX ORDER — TELMISARTAN 40 MG/1
40 TABLET ORAL DAILY
Qty: 90 TABLET | Refills: 3 | Status: SHIPPED | OUTPATIENT
Start: 2019-06-21 | End: 2020-04-13

## 2020-04-13 RX ORDER — TELMISARTAN 40 MG/1
TABLET ORAL
Qty: 90 TABLET | Refills: 3 | Status: SHIPPED | OUTPATIENT
Start: 2020-04-13 | End: 2021-04-19

## 2020-07-28 DIAGNOSIS — Z00.00 WELL ADULT EXAM: Primary | ICD-10-CM

## 2020-07-28 DIAGNOSIS — Z12.5 PROSTATE CANCER SCREENING: ICD-10-CM

## 2020-07-28 DIAGNOSIS — E78.01 FAMILIAL HYPERCHOLESTEROLEMIA: ICD-10-CM

## 2020-07-28 DIAGNOSIS — E55.9 VITAMIN D DEFICIENCY: ICD-10-CM

## 2020-08-04 ENCOUNTER — OFFICE VISIT (OUTPATIENT)
Dept: FAMILY MEDICINE CLINIC | Facility: CLINIC | Age: 65
End: 2020-08-04

## 2020-08-04 VITALS
SYSTOLIC BLOOD PRESSURE: 124 MMHG | WEIGHT: 228 LBS | BODY MASS INDEX: 28.35 KG/M2 | HEART RATE: 93 BPM | RESPIRATION RATE: 18 BRPM | HEIGHT: 75 IN | OXYGEN SATURATION: 99 % | DIASTOLIC BLOOD PRESSURE: 76 MMHG | TEMPERATURE: 98.5 F

## 2020-08-04 DIAGNOSIS — E55.9 VITAMIN D DEFICIENCY: ICD-10-CM

## 2020-08-04 DIAGNOSIS — I10 ESSENTIAL HYPERTENSION: Primary | ICD-10-CM

## 2020-08-04 DIAGNOSIS — Z00.00 WELL ADULT EXAM: ICD-10-CM

## 2020-08-04 DIAGNOSIS — E78.01 FAMILIAL HYPERCHOLESTEROLEMIA: ICD-10-CM

## 2020-08-04 DIAGNOSIS — Z12.5 PROSTATE CANCER SCREENING: ICD-10-CM

## 2020-08-04 LAB
25(OH)D3 SERPL-MCNC: 49.3 NG/ML (ref 30–100)
ALBUMIN SERPL-MCNC: 4.2 G/DL (ref 3.5–5.2)
ALBUMIN/GLOB SERPL: 1.4 G/DL
ALP SERPL-CCNC: 72 U/L (ref 39–117)
ALT SERPL W P-5'-P-CCNC: 27 U/L (ref 1–41)
ANION GAP SERPL CALCULATED.3IONS-SCNC: 8.2 MMOL/L (ref 5–15)
AST SERPL-CCNC: 27 U/L (ref 1–40)
BILIRUB SERPL-MCNC: 0.6 MG/DL (ref 0–1.2)
BUN SERPL-MCNC: 15 MG/DL (ref 8–23)
BUN/CREAT SERPL: 14.7 (ref 7–25)
CALCIUM SPEC-SCNC: 9.6 MG/DL (ref 8.6–10.5)
CHLORIDE SERPL-SCNC: 105 MMOL/L (ref 98–107)
CHOLEST SERPL-MCNC: 181 MG/DL (ref 0–200)
CO2 SERPL-SCNC: 26.8 MMOL/L (ref 22–29)
CREAT SERPL-MCNC: 1.02 MG/DL (ref 0.76–1.27)
DEPRECATED RDW RBC AUTO: 48.8 FL (ref 37–54)
ERYTHROCYTE [DISTWIDTH] IN BLOOD BY AUTOMATED COUNT: 14.4 % (ref 12.3–15.4)
GFR SERPL CREATININE-BSD FRML MDRD: 74 ML/MIN/1.73
GLOBULIN UR ELPH-MCNC: 2.9 GM/DL
GLUCOSE SERPL-MCNC: 90 MG/DL (ref 65–99)
HCT VFR BLD AUTO: 40.5 % (ref 37.5–51)
HDLC SERPL-MCNC: 32 MG/DL (ref 40–60)
HGB BLD-MCNC: 13.6 G/DL (ref 13–17.7)
LDLC SERPL CALC-MCNC: 125 MG/DL (ref 0–100)
LDLC/HDLC SERPL: 3.91 {RATIO}
MCH RBC QN AUTO: 30.8 PG (ref 26.6–33)
MCHC RBC AUTO-ENTMCNC: 33.6 G/DL (ref 31.5–35.7)
MCV RBC AUTO: 91.8 FL (ref 79–97)
PLATELET # BLD AUTO: 236 10*3/MM3 (ref 140–450)
PMV BLD AUTO: 10.2 FL (ref 6–12)
POTASSIUM SERPL-SCNC: 4.3 MMOL/L (ref 3.5–5.2)
PROT SERPL-MCNC: 7.1 G/DL (ref 6–8.5)
PSA SERPL-MCNC: 1.44 NG/ML (ref 0–4)
RBC # BLD AUTO: 4.41 10*6/MM3 (ref 4.14–5.8)
SODIUM SERPL-SCNC: 140 MMOL/L (ref 136–145)
TRIGL SERPL-MCNC: 120 MG/DL (ref 0–150)
TSH SERPL DL<=0.05 MIU/L-ACNC: 1.15 UIU/ML (ref 0.27–4.2)
VLDLC SERPL-MCNC: 24 MG/DL (ref 5–40)
WBC # BLD AUTO: 5.31 10*3/MM3 (ref 3.4–10.8)

## 2020-08-04 PROCEDURE — 82306 VITAMIN D 25 HYDROXY: CPT | Performed by: FAMILY MEDICINE

## 2020-08-04 PROCEDURE — 93000 ELECTROCARDIOGRAM COMPLETE: CPT | Performed by: FAMILY MEDICINE

## 2020-08-04 PROCEDURE — 80061 LIPID PANEL: CPT | Performed by: FAMILY MEDICINE

## 2020-08-04 PROCEDURE — 99396 PREV VISIT EST AGE 40-64: CPT | Performed by: FAMILY MEDICINE

## 2020-08-04 PROCEDURE — 84443 ASSAY THYROID STIM HORMONE: CPT | Performed by: FAMILY MEDICINE

## 2020-08-04 PROCEDURE — 80053 COMPREHEN METABOLIC PANEL: CPT | Performed by: FAMILY MEDICINE

## 2020-08-04 PROCEDURE — G0103 PSA SCREENING: HCPCS | Performed by: FAMILY MEDICINE

## 2020-08-04 PROCEDURE — 85027 COMPLETE CBC AUTOMATED: CPT | Performed by: FAMILY MEDICINE

## 2020-08-04 NOTE — PROGRESS NOTES
"Jenifer Raymond is a 64 y.o. male and is here for a comprehensive physical exam. The patient reports no problems.      The following portions of the patient's history were reviewed and updated as appropriate: allergies, current medications, past family history, past medical history, past social history, past surgical history and problem list.    Allergies   Allergen Reactions   • Penicillins Unknown (See Comments)     As a child   • Tramadol Hcl Nausea And Vomiting       Past Medical History:   Diagnosis Date   • Hematospermia    • Hypertension    • Sciatica      Past Surgical History:   Procedure Laterality Date   • CATARACT EXTRACTION     • HERNIA REPAIR     • TONSILLECTOMY  1967     Family History   Problem Relation Age of Onset   • Hypotension Mother    • Stroke Father    • Hypertension Father      Social History     Socioeconomic History   • Marital status:      Spouse name: Not on file   • Number of children: Not on file   • Years of education: Not on file   • Highest education level: Not on file   Tobacco Use   • Smoking status: Never Smoker   • Smokeless tobacco: Never Used   Substance and Sexual Activity   • Alcohol use: Yes     Alcohol/week: 1.0 - 2.0 standard drinks     Types: 1 - 2 Cans of beer per week     Comment: very rare   • Drug use: No   • Sexual activity: Defer         Objective     Review of Systems    /76   Pulse 93   Temp 98.5 °F (36.9 °C)   Resp 18   Ht 190.5 cm (75\")   Wt 103 kg (228 lb)   SpO2 99%   BMI 28.50 kg/m²     Review of Systems   Constitutional: Negative for activity change and unexpected weight change.   HENT: Negative for congestion and sore throat.    Eyes:        Recent eye exam   Respiratory: Negative for cough and shortness of breath.    Cardiovascular: Negative for chest pain, palpitations and leg swelling.   Gastrointestinal: Negative for diarrhea, nausea and vomiting.   Genitourinary: Negative for dysuria and hematuria.   Musculoskeletal: " Positive for arthralgias. Negative for joint swelling.        Right knee pain and occasional low back pain   Skin: Negative for color change and rash.        Recent dermatology evaluation   Allergic/Immunologic: Negative for environmental allergies and food allergies.   Neurological: Negative for syncope and headaches.   Hematological: Negative for adenopathy. Does not bruise/bleed easily.   Psychiatric/Behavioral: Negative for dysphoric mood and sleep disturbance. The patient is not nervous/anxious.            Physical Exam     Physical Exam   Constitutional: He is oriented to person, place, and time. He appears well-developed and well-nourished. He is cooperative.   HENT:   Head: Normocephalic and atraumatic.   Eyes: Pupils are equal, round, and reactive to light. Conjunctivae are normal. No scleral icterus.   Neck: Neck supple. Carotid bruit is not present. No thyromegaly present.   Cardiovascular: Normal rate, regular rhythm, normal heart sounds and intact distal pulses.   Pulmonary/Chest: Effort normal and breath sounds normal.   Abdominal: Soft. Bowel sounds are normal. He exhibits no mass. There is no hepatosplenomegaly.   Musculoskeletal: Normal range of motion. He exhibits no edema or tenderness.   Neurological: He is alert and oriented to person, place, and time.   No focal deficits no lateralizing signs   Skin: Skin is warm and dry. No rash noted.   Psychiatric: He has a normal mood and affect. His behavior is normal. Judgment and thought content normal. Cognition and memory are normal.   Nursing note and vitals reviewed.          Assessment/Plan   Healthy male exam.     1.   Patient Active Problem List   Diagnosis   • Bifascicular block   • Hyperlipidemia   • Abnormal EKG   • Pain of toe of left foot   • Essential hypertension     2. Patient Counseling:  --Nutrition: Stressed importance of moderation in sodium/caffeine intake, saturated fat and cholesterol, caloric balance, sufficient intake of fresh  fruits, vegetables, fiber, calcium, iron, and 1 mg of folate supplement per day (for females capable of pregnancy).  --Discussed the issue of estrogen replacement, calcium supplement, and the daily use of baby aspirin.  --Exercise: Stressed the importance of regular exercise.   --Substance Abuse: Discussed cessation/primary prevention of tobacco, alcohol, or other drug use; driving or other dangerous activities under the influence; availability of treatment for abuse.    --Sexuality: Discussed sexually transmitted diseases, partner selection, use of condoms, avoidance of unintended pregnancy  and contraceptive alternatives.   --Injury prevention: Discussed safety belts, safety helmets, smoke detector, smoking near bedding or upholstery.   --Dental health: Discussed importance of regular tooth brushing, flossing, and dental visits.  --Immunizations reviewed.  --Discussed benefits of screening colonoscopy.  --After hours service discussed with patient    3. Discussed the patient's BMI with him.  The BMI is above average; BMI management plan is completed  4. Follow up in one year    See form    ECG 12 Lead  Date/Time: 8/4/2020 5:29 PM  Performed by: Reggie Monzon MD  Authorized by: Reggie Monzon MD   Comparison: compared with previous ECG   Similar to previous ECG  Rhythm: sinus rhythm  Rate: normal  BPM: 88  Conduction: right bundle branch block and left anterior fascicular block  QRS axis: left  Other findings: non-specific ST-T wave changes    Clinical impression: abnormal EKG                 Reggie Monzon MD  08/04/2020  5:25 PM

## 2021-02-10 ENCOUNTER — OFFICE VISIT (OUTPATIENT)
Dept: CARDIOLOGY | Facility: CLINIC | Age: 66
End: 2021-02-10

## 2021-02-10 VITALS
HEART RATE: 96 BPM | WEIGHT: 224.6 LBS | TEMPERATURE: 97.1 F | BODY MASS INDEX: 27.93 KG/M2 | OXYGEN SATURATION: 99 % | DIASTOLIC BLOOD PRESSURE: 84 MMHG | SYSTOLIC BLOOD PRESSURE: 134 MMHG | HEIGHT: 75 IN

## 2021-02-10 DIAGNOSIS — R09.89 LABILE HYPERTENSION: Primary | ICD-10-CM

## 2021-02-10 DIAGNOSIS — I45.2 BIFASCICULAR BLOCK: ICD-10-CM

## 2021-02-10 DIAGNOSIS — E78.5 DYSLIPIDEMIA: ICD-10-CM

## 2021-02-10 PROCEDURE — 99214 OFFICE O/P EST MOD 30 MIN: CPT | Performed by: INTERNAL MEDICINE

## 2021-02-10 RX ORDER — ROSUVASTATIN CALCIUM 10 MG/1
10 TABLET, COATED ORAL DAILY
Qty: 90 TABLET | Refills: 3 | Status: SHIPPED | OUTPATIENT
Start: 2021-02-10 | End: 2021-05-17 | Stop reason: DRUGHIGH

## 2021-02-10 NOTE — PROGRESS NOTES
Subjective:     Encounter Date:02/10/2021    Patient ID: Christopher Raymond is a 65 y.o.  white male, manager of a [employer], from [place], [place].     PHYSICIAN: Reggie Monzon MD  OPHTHALMOLOGIST:  Unknown    Chief Complaint:   Chief Complaint   Patient presents with   • Labile hypertension       Problem List:  1. Abnormal EKG with asymptomatic bifascicular block:  a. Abnormal EKG with chronic right bundle branch block with left anterior hemiblock.  b. Remote acceptable echocardiographic GXT, November 2007.  c. Residual class I symptoms with remote echocardiographic GXT.  2. Remote tonsillectomy, 1967.  3. Hypertension - probably essential, with recent labile hypertensive blood pressure readings, 2019  4. Mildly overweight, BMI 27.5.  5. Dyslipidemia - 10 year ASCVD risk is 15.9% untreated, 7.5% with treatment, August 2020  6. Remote cataract surgery, 2016.  7. Fall with dislocated 5th digit right hand and laceration with suture repair, December 2020    Allergies   Allergen Reactions   • Penicillins Unknown (See Comments)     As a child   • Tramadol Hcl Nausea And Vomiting       Current Outpatient Medications:   •  Ascorbic Acid (VITAMIN C PO), Take 1,000 mg by mouth Daily., Disp: , Rfl:   •  aspirin 81 MG tablet, Take 81 mg by mouth daily., Disp: , Rfl:   •  Cholecalciferol (VITAMIN D) 2000 units tablet, Take 2,000 Units by mouth Daily., Disp: , Rfl:   •  Multiple Vitamins-Minerals (MULTIVITAMIN PO), Take 1 tablet by mouth Daily., Disp: , Rfl:   •  telmisartan (MICARDIS) 40 MG tablet, TAKE ONE TABLET BY MOUTH DAILY, Disp: 90 tablet, Rfl: 3    History of Present Illness: Patient returns after a 23-month hiatus for follow up. Since last visit, he has been feeling well overall from a cardiovascular standpoint. Patient denies chest pain, shortness of breath, palpitations, edema, dizziness, and syncope. He has had no interim ER visits, hospitalizations, serious illnesses, or  "surgeries. He experiences occasional chest symptoms which he attributes to food-related GI problems, rather than heart or lung. He reports he stays active and walks for exercise and does not have significant cardiopulmonary limitations. He walks approximately 40 minutes every other day. After his last FLP check, he stopped eating egg yolks for 1 month and had it rechecked. His subsequent FLP showed a decrease in triglycerides by 20 points. He is anticipating COVID immunization when available. He has started taking an 8 mg iron supplement as he had been having low hemoglobin readings when donating blood and he has had normal hemoglobin levels since.       ROS   Obtained and negative except as outlined in problem list and HPI.    Procedures       Objective:       Vitals:    02/10/21 0935 02/10/21 0940   BP: 125/87 134/84   BP Location: Right arm Right arm   Patient Position: Sitting Standing   Pulse: 87 96   Temp: 97.1 °F (36.2 °C)    SpO2: 99%    Weight: 102 kg (224 lb 9.6 oz)    Height: 190.5 cm (75\")      Body mass index is 28.07 kg/m².  Last weight: 220 lbs    Vitals signs reviewed.   Constitutional:       Appearance: Well-developed.   Neck:      Thyroid: No thyromegaly.      Vascular: No carotid bruit or JVD.      Lymphadenopathy: No cervical adenopathy.   Pulmonary:      Effort: Pulmonary effort is normal.      Breath sounds: Normal breath sounds. No wheezing. No rhonchi. No rales.   Cardiovascular:      Regular rhythm.      No gallop. No S3 gallop.   Pulses:     Dorsalis pedis: 2+ bilaterally.     Posterior tibial: 2+ bilaterally.  Edema:     Peripheral edema absent.   Abdominal:      Palpations: Abdomen is soft. There is no abdominal mass.      Tenderness: There is no abdominal tenderness. There is no guarding.   Musculoskeletal: Normal range of motion.   Skin:     General: Skin is warm and dry.      Findings: No rash.   Neurological:      Mental Status: Alert and oriented to person, place, and time. "           Lab Review:   Lab Results   Component Value Date    GLUCOSE 90 08/04/2020    BUN 15 08/04/2020    CREATININE 1.02 08/04/2020    EGFRIFNONA 74 08/04/2020    BCR 14.7 08/04/2020     08/04/2020    K 4.3 08/04/2020     08/04/2020    CO2 26.8 08/04/2020    CALCIUM 9.6 08/04/2020    PROTENTOTREF 6.9 03/21/2019    ALBUMIN 4.20 08/04/2020    LABIL2 1.8 03/21/2019    AST 27 08/04/2020    ALT 27 08/04/2020       Lab Results   Component Value Date    WBC 5.31 08/04/2020    HGB 13.6 08/04/2020    HCT 40.5 08/04/2020    MCV 91.8 08/04/2020     08/04/2020       Lab Results   Component Value Date    TSH 1.150 08/04/2020       Lab Results   Component Value Date    CHOL 181 08/04/2020    CHOL 156 03/13/2017     Lab Results   Component Value Date    TRIG 120 08/04/2020    TRIG 83 03/21/2019    TRIG 131 03/20/2018     Lab Results   Component Value Date    HDL 32 (L) 08/04/2020    HDL 35 (L) 03/21/2019    HDL 33 (L) 03/20/2018     Lab Results   Component Value Date     (H) 08/04/2020     (H) 03/21/2019     (H) 03/20/2018     XR Right Hand, 12/6/2020:  Dislocation of the fifth PIP joint with dorsal displacement of the middle phalanx relative to the proximal phalanx. No definite underlying fracture.    XR Right Hand, 12/6/2020:  Reduced dislocation. The proximal interphalangeal joint of the right finger has been restored to normal articulation.         Assessment:       Overall continued acceptable course with no new interim cardiopulmonary complaints with good functional status. We will defer additional diagnostic or therapeutic intervention from a cardiac perspective at this time.     Diagnosis Plan   1. Labile hypertension  Well controlled. Continue current treatment.   2. Bifascicular block  Stable examination. Defer additional studies at this time.   3. Dyslipidemia Uncontrolled currently. Initiate rosuvastatin 10 mg daily.          Plan:         1. Patient to continue current  medications and close follow up with the above providers.  2. In 3 months he will have the following studies performed:  A. FLP  B. Serum iron/ iron binding capacity  3. Encouraged COVID immunization when available.  4. Encouraged regular aerobic exercise, at least 30 minutes daily, 4 days per week.  5. Tentative cardiology follow up in February 2022 or patient may return sooner PRN.    I, Landry Reis, attest that this documentation has been prepared under the direction and in the presence of Jaden Doran MD 02/10/2021    I, Jaden Doran MD, [facility], personally performed the services described in this documentation as scribed by the above named individual in my presence, and it is both accurate and complete. At 10:20 EST on 02/10/2021

## 2021-03-01 ENCOUNTER — TELEPHONE (OUTPATIENT)
Dept: FAMILY MEDICINE CLINIC | Facility: CLINIC | Age: 66
End: 2021-03-01

## 2021-03-01 NOTE — TELEPHONE ENCOUNTER
Patient called and stated that he saw an ENT and is not sure the name.  Patient stated that they were on the corner of [facility] [address] [facility].  Patient called to get the doctor's name.    Patient callback: [phone]    Please advise

## 2021-03-17 ENCOUNTER — TELEPHONE (OUTPATIENT)
Dept: CARDIOLOGY | Facility: CLINIC | Age: 66
End: 2021-03-17

## 2021-03-17 NOTE — TELEPHONE ENCOUNTER
----- Message from Christopher Raymond sent at 3/17/2021 10:14 AM EDT -----  Regarding: Visit Follow-Up Question  Contact: [phone]  Dr Doran, I went to see an ENT specialist about some recurring left ear pain: Dr Idania Pulliam. She also ordered an MRI, so we now have a baseline brain MRI for our records.    1) I included record of the ENT visit. Bottom line is that there may be some drainage issues, but nothing serious.  2) More important for your knowledge: I also included Dr Jnony Mchugh's report on the Brain scan MRI. It showed some premature small vessel ischemic changes. Nothing else was found of significance. (I have the CD of the MRI.)    Dr Pulliam said that if I had any other brain related symptoms that I should see a Neurologist. However, I don't have any memory loss or related symptoms. I am curious as to your thoughts on this as well.    Thank you!    Jimy

## 2021-03-17 NOTE — TELEPHONE ENCOUNTER
Noted; MRI scan report reviewed.  These are incidental findings and he needs to continue efforts to ensure that blood pressure, glucose, and lipids remain normal and he remains physically active.  He can solicit an neurology opinion but I do not think they would alter his treatment otherwise.    Thanks!

## 2021-04-19 RX ORDER — TELMISARTAN 40 MG/1
TABLET ORAL
Qty: 90 TABLET | Refills: 3 | Status: SHIPPED | OUTPATIENT
Start: 2021-04-19 | End: 2022-04-11

## 2021-05-06 LAB
AMBIG ABBREV LP DEFAULT: NORMAL
CHOLEST SERPL-MCNC: 91 MG/DL (ref 100–199)
HDLC SERPL-MCNC: 27 MG/DL
IRON SERPL-MCNC: 81 UG/DL (ref 38–169)
LDLC SERPL CALC-MCNC: 48 MG/DL (ref 0–99)
TRIGL SERPL-MCNC: 74 MG/DL (ref 0–149)
VLDLC SERPL CALC-MCNC: 16 MG/DL (ref 5–40)

## 2021-05-17 DIAGNOSIS — E78.01 FAMILIAL HYPERCHOLESTEROLEMIA: Primary | ICD-10-CM

## 2021-05-17 RX ORDER — ROSUVASTATIN CALCIUM 5 MG/1
5 TABLET, COATED ORAL DAILY
Qty: 90 TABLET | Refills: 3 | Status: SHIPPED | OUTPATIENT
Start: 2021-05-17 | End: 2022-05-12

## 2021-05-17 NOTE — TELEPHONE ENCOUNTER
----- Message from Christopher Raymond sent at 5/15/2021 11:55 AM EDT -----  Regarding: Test Results Question  Contact: [phone]  Hello. I put 2+2 together for myself this morning. I realized that about 3 weeks after I started the Crestor, the low level of tinnitus that I had became elevated a bit. Enough that I had made an appointment with a ENT specialist who found nothing physically wrong. (You may recall my communication about the head MRI that happened. That was requested by the ENT doctor.)    I did a quick look and it seems that this may be a rare side effect of cholesterol medicine.    The current level of tinnitus is live-able, but it is definitely increased. It causes more of a worry than a real issue.    My question / thought: What if I decrease the dosage on the Crestor to 5mg from 10mg? The 10mg had a very positive impact on my bad cholesterol, maybe too positive! What if I decrease it for a couple months and then check my lipids and see if my tinnitus lessens and then decide the path forward after that experiment?    Thoughts?    Thanks, Jimy

## 2021-05-17 NOTE — TELEPHONE ENCOUNTER
Noted; very perplexing.  I have no problem with him reducing his rosuvastatin to 5 mg daily and having a repeat FLP in 3 months.    Thanks!

## 2021-07-07 ENCOUNTER — OFFICE VISIT (OUTPATIENT)
Dept: FAMILY MEDICINE CLINIC | Facility: CLINIC | Age: 66
End: 2021-07-07

## 2021-07-07 VITALS
OXYGEN SATURATION: 98 % | BODY MASS INDEX: 29.34 KG/M2 | DIASTOLIC BLOOD PRESSURE: 80 MMHG | SYSTOLIC BLOOD PRESSURE: 128 MMHG | WEIGHT: 236 LBS | HEART RATE: 88 BPM | HEIGHT: 75 IN | TEMPERATURE: 98 F

## 2021-07-07 DIAGNOSIS — B88.0 CHIGGERS: ICD-10-CM

## 2021-07-07 DIAGNOSIS — I10 ESSENTIAL HYPERTENSION: Primary | ICD-10-CM

## 2021-07-07 PROCEDURE — 99213 OFFICE O/P EST LOW 20 MIN: CPT | Performed by: FAMILY MEDICINE

## 2021-07-08 NOTE — PROGRESS NOTES
"Chief Complaint  Hypertension (possible scabies) and Hyperlipidemia    Subjective          Christopher Raymond presents to [facility]  Hypertension  This is a chronic problem. The problem is unchanged. The problem is controlled.   Rash  This is a new (was in the grass now has spots on legs) problem. The current episode started in the past 7 days. The affected locations include the left lower leg, right lower leg and right upper leg. The rash is characterized by redness and itchiness. Associated with: yard grass. Pertinent negatives include no facial edema or fever. Past treatments include anti-itch cream. The treatment provided no relief.       Objective   Vital Signs:   /80   Pulse 88   Temp 98 °F (36.7 °C)   Ht 190.5 cm (75\")   Wt 107 kg (236 lb)   SpO2 98%   BMI 29.50 kg/m²     Physical Exam  Vitals and nursing note reviewed.   Constitutional:       Appearance: Normal appearance.   HENT:      Head: Normocephalic.   Eyes:      Conjunctiva/sclera: Conjunctivae normal.      Pupils: Pupils are equal, round, and reactive to light.   Cardiovascular:      Rate and Rhythm: Normal rate and regular rhythm.      Pulses: Normal pulses.      Heart sounds: Normal heart sounds.   Pulmonary:      Breath sounds: Normal breath sounds.   Musculoskeletal:      Cervical back: Neck supple.   Skin:     Findings: Rash present.      Comments: Small oval reddened spots on legs   Neurological:      Mental Status: He is alert.        Result Review :                 Assessment and Plan    Diagnoses and all orders for this visit:    1. Essential hypertension (Primary)    2. [place]  -     hydrocortisone 2.5 % cream; Apply  topically to the appropriate area as directed 2 (Two) Times a Day.  Dispense: 30 g; Refill: 0        Follow Up   Return in about 2 months (around 9/7/2021) for Annual.  Patient was given instructions and counseling regarding his condition or for health maintenance advice. Please see " specific information pulled into the AVS if appropriate.     Reassured he does not have scabies

## 2021-08-13 LAB
AMBIG ABBREV LP DEFAULT: NORMAL
CHOLEST SERPL-MCNC: 120 MG/DL (ref 100–199)
HDLC SERPL-MCNC: 37 MG/DL
LDLC SERPL CALC-MCNC: 66 MG/DL (ref 0–99)
TRIGL SERPL-MCNC: 90 MG/DL (ref 0–149)
VLDLC SERPL CALC-MCNC: 17 MG/DL (ref 5–40)

## 2021-08-16 ENCOUNTER — TELEPHONE (OUTPATIENT)
Dept: CARDIOLOGY | Facility: CLINIC | Age: 66
End: 2021-08-16

## 2021-08-16 NOTE — TELEPHONE ENCOUNTER
----- Message from Christopher Raymond sent at 8/14/2021  8:54 AM EDT -----  Regarding: Test Results Question  Contact: [phone]  Hello, Looking at my Lipid panel results, it would seem OK to continue with the 5mg dose (versus the 10mg) of the Rosuvastatin. Do you agree?    It is interesting that my HDL number is slightly higher than normal. This may reflect more physical exercise of recent; as I have been doing a significant home improvement project outside in the last couple weeks. I am not sure. I didn't think that the medicine would affect HDL. Your thoughts?    Thank you!    Jimy

## 2021-08-16 NOTE — TELEPHONE ENCOUNTER
Noted.  Thank him for allowing us to review his laboratory results.  Would continue current dose of Crestor and remain active. I do feel his activity probably improved his HDL cholesterol value.    Thanks!

## 2021-08-16 NOTE — TELEPHONE ENCOUNTER
Patient reduced Crestor from 10 mg to 5 mg in May due to tinnitus. Patient had repeat labs, in Epic.    Ok for patient to continue Crestor 5 mg daily?    Please advise.

## 2022-01-04 ENCOUNTER — OFFICE VISIT (OUTPATIENT)
Dept: FAMILY MEDICINE CLINIC | Facility: CLINIC | Age: 67
End: 2022-01-04

## 2022-01-04 VITALS
BODY MASS INDEX: 28.95 KG/M2 | TEMPERATURE: 97.3 F | OXYGEN SATURATION: 98 % | WEIGHT: 232.8 LBS | HEIGHT: 75 IN | SYSTOLIC BLOOD PRESSURE: 128 MMHG | DIASTOLIC BLOOD PRESSURE: 82 MMHG | HEART RATE: 109 BPM | RESPIRATION RATE: 16 BRPM

## 2022-01-04 DIAGNOSIS — Z12.5 PROSTATE CANCER SCREENING: ICD-10-CM

## 2022-01-04 DIAGNOSIS — E78.01 FAMILIAL HYPERCHOLESTEROLEMIA: ICD-10-CM

## 2022-01-04 DIAGNOSIS — I10 ESSENTIAL HYPERTENSION: ICD-10-CM

## 2022-01-04 DIAGNOSIS — Z00.00 WELL ADULT EXAM: Primary | ICD-10-CM

## 2022-01-04 DIAGNOSIS — Z12.11 SCREEN FOR COLON CANCER: ICD-10-CM

## 2022-01-04 DIAGNOSIS — E55.9 VITAMIN D DEFICIENCY: ICD-10-CM

## 2022-01-04 PROCEDURE — 99396 PREV VISIT EST AGE 40-64: CPT | Performed by: FAMILY MEDICINE

## 2022-01-04 NOTE — PROGRESS NOTES
"Subjective   Christopher Raymond is a 66 y.o. male and is here for a comprehensive physical exam. The patient reports no problems.      The following portions of the patient's history were reviewed and updated as appropriate: allergies, current medications, past family history, past medical history, past social history, past surgical history and problem list.    Allergies   Allergen Reactions   • Penicillins Unknown (See Comments)     As a child   • Tramadol Hcl Nausea And Vomiting       Past Medical History:   Diagnosis Date   • Hematospermia    • Hypertension    • Sciatica      Past Surgical History:   Procedure Laterality Date   • CATARACT EXTRACTION     • HERNIA REPAIR     • TONSILLECTOMY  1967     Family History   Problem Relation Age of Onset   • Hypotension Mother    • Stroke Father    • Hypertension Father      Social History     Socioeconomic History   • Marital status:    Tobacco Use   • Smoking status: Never Smoker   • Smokeless tobacco: Never Used   Substance and Sexual Activity   • Alcohol use: Yes     Alcohol/week: 1.0 - 2.0 standard drink     Types: 1 - 2 Cans of beer per week     Comment: very rare   • Drug use: No   • Sexual activity: Defer         Objective     Review of Systems    /82   Pulse 109   Temp 97.3 °F (36.3 °C)   Resp 16   Ht 190.5 cm (75\")   Wt 106 kg (232 lb 12.8 oz)   SpO2 98%   BMI 29.10 kg/m²     Review of Systems   Constitutional: Negative for activity change and unexpected weight change.   HENT: Negative for congestion and sore throat.         Increased problem with tinnitus since starting Crestor reduction in dose has helped somewhat  URI symptoms had a negative Covid test   Eyes: Negative for pain and visual disturbance.   Respiratory: Negative for cough and shortness of breath.    Cardiovascular: Negative for chest pain, palpitations and leg swelling.   Gastrointestinal: Negative for diarrhea, nausea and vomiting.   Endocrine: Negative for cold intolerance and heat " intolerance.   Genitourinary: Negative for dysuria and hematuria.   Musculoskeletal: Positive for arthralgias. Negative for joint swelling.   Skin: Negative for color change and rash.        Followed by dermatology   Allergic/Immunologic: Negative for environmental allergies and food allergies.   Neurological: Negative for syncope and headaches.   Hematological: Negative for adenopathy. Does not bruise/bleed easily.   Psychiatric/Behavioral: Negative for dysphoric mood and sleep disturbance. The patient is not nervous/anxious.            Physical Exam     Physical Exam  Vitals and nursing note reviewed.   Constitutional:       Appearance: Normal appearance. He is well-developed.   HENT:      Head: Normocephalic and atraumatic.      Right Ear: Tympanic membrane normal.      Left Ear: Tympanic membrane normal.      Nose: Nose normal.   Eyes:      General: No scleral icterus.     Conjunctiva/sclera: Conjunctivae normal.      Pupils: Pupils are equal, round, and reactive to light.   Neck:      Thyroid: No thyromegaly.      Vascular: No carotid bruit.      Comments: No JVD no thyromegaly  Cardiovascular:      Rate and Rhythm: Normal rate and regular rhythm.      Heart sounds: Normal heart sounds.   Pulmonary:      Effort: Pulmonary effort is normal.      Breath sounds: Normal breath sounds.   Musculoskeletal:         General: Normal range of motion.      Cervical back: Neck supple.      Right lower leg: No edema.      Left lower leg: No edema.      Comments: Mild crepitation of the patellae   Lymphadenopathy:      Cervical: No cervical adenopathy.   Skin:     General: Skin is warm and dry.      Findings: No rash.   Neurological:      General: No focal deficit present.      Mental Status: He is alert and oriented to person, place, and time.      Comments: No focal deficits no lateralizing signs   Psychiatric:         Mood and Affect: Mood normal.         Behavior: Behavior is cooperative.           Assessment/Plan    Healthy male exam.     1.   Patient Active Problem List   Diagnosis   • Bifascicular block   • Hyperlipidemia   • Abnormal EKG   • Pain of toe of left foot   • Essential hypertension     2. Patient Counseling:  --Nutrition: Stressed importance of moderation in sodium/caffeine intake, saturated fat and cholesterol, caloric balance, sufficient intake of fresh fruits, vegetables, fiber, calcium, iron, and 1 mg of folate supplement per day (for females capable of pregnancy).  --Discussed the issue of estrogen replacement, calcium supplement, and the daily use of baby aspirin.  --Exercise: Stressed the importance of regular exercise.   --Substance Abuse: Discussed cessation/primary prevention of tobacco, alcohol, or other drug use; driving or other dangerous activities under the influence; availability of treatment for abuse.    --Sexuality: Discussed sexually transmitted diseases, partner selection, use of condoms, avoidance of unintended pregnancy  and contraceptive alternatives.   --Injury prevention: Discussed safety belts, safety helmets, smoke detector, smoking near bedding or upholstery.   --Dental health: Discussed importance of regular tooth brushing, flossing, and dental visits.  --Immunizations reviewed.  --Discussed benefits of screening colonoscopy.  --After hours service discussed with patient    3. Discussed the patient's BMI with him.  The BMI is above average; no BMI management plan is appropriate  4. Follow up in 6 months    See form         Reggie Monzon MD  01/04/2022  17:29 EST

## 2022-01-05 ENCOUNTER — LAB (OUTPATIENT)
Dept: LAB | Facility: HOSPITAL | Age: 67
End: 2022-01-05

## 2022-01-05 DIAGNOSIS — E55.9 VITAMIN D DEFICIENCY: ICD-10-CM

## 2022-01-05 DIAGNOSIS — Z00.00 WELL ADULT EXAM: ICD-10-CM

## 2022-01-05 DIAGNOSIS — Z12.5 PROSTATE CANCER SCREENING: ICD-10-CM

## 2022-01-05 DIAGNOSIS — E78.01 FAMILIAL HYPERCHOLESTEROLEMIA: ICD-10-CM

## 2022-01-05 DIAGNOSIS — I10 ESSENTIAL HYPERTENSION: ICD-10-CM

## 2022-01-05 LAB
25(OH)D3 SERPL-MCNC: 51.1 NG/ML (ref 30–100)
ALBUMIN SERPL-MCNC: 4.4 G/DL (ref 3.5–5.2)
ALBUMIN/GLOB SERPL: 1.8 G/DL
ALP SERPL-CCNC: 78 U/L (ref 39–117)
ALT SERPL W P-5'-P-CCNC: 37 U/L (ref 1–41)
ANION GAP SERPL CALCULATED.3IONS-SCNC: 12.5 MMOL/L (ref 5–15)
AST SERPL-CCNC: 30 U/L (ref 1–40)
BILIRUB SERPL-MCNC: 0.5 MG/DL (ref 0–1.2)
BUN SERPL-MCNC: 14 MG/DL (ref 8–23)
BUN/CREAT SERPL: 14.3 (ref 7–25)
CALCIUM SPEC-SCNC: 9.1 MG/DL (ref 8.6–10.5)
CHLORIDE SERPL-SCNC: 104 MMOL/L (ref 98–107)
CHOLEST SERPL-MCNC: 123 MG/DL (ref 0–200)
CO2 SERPL-SCNC: 24.5 MMOL/L (ref 22–29)
CREAT SERPL-MCNC: 0.98 MG/DL (ref 0.76–1.27)
DEPRECATED RDW RBC AUTO: 44.9 FL (ref 37–54)
ERYTHROCYTE [DISTWIDTH] IN BLOOD BY AUTOMATED COUNT: 12.8 % (ref 12.3–15.4)
GFR SERPL CREATININE-BSD FRML MDRD: 77 ML/MIN/1.73
GLOBULIN UR ELPH-MCNC: 2.5 GM/DL
GLUCOSE SERPL-MCNC: 100 MG/DL (ref 65–99)
HCT VFR BLD AUTO: 43.6 % (ref 37.5–51)
HDLC SERPL-MCNC: 32 MG/DL (ref 40–60)
HGB BLD-MCNC: 14.5 G/DL (ref 13–17.7)
LDLC SERPL CALC-MCNC: 73 MG/DL (ref 0–100)
LDLC/HDLC SERPL: 2.24 {RATIO}
MCH RBC QN AUTO: 32 PG (ref 26.6–33)
MCHC RBC AUTO-ENTMCNC: 33.3 G/DL (ref 31.5–35.7)
MCV RBC AUTO: 96.2 FL (ref 79–97)
PLATELET # BLD AUTO: 238 10*3/MM3 (ref 140–450)
PMV BLD AUTO: 9.6 FL (ref 6–12)
POTASSIUM SERPL-SCNC: 4.3 MMOL/L (ref 3.5–5.2)
PROT SERPL-MCNC: 6.9 G/DL (ref 6–8.5)
PSA SERPL-MCNC: 1.25 NG/ML (ref 0–4)
RBC # BLD AUTO: 4.53 10*6/MM3 (ref 4.14–5.8)
SODIUM SERPL-SCNC: 141 MMOL/L (ref 136–145)
TRIGL SERPL-MCNC: 96 MG/DL (ref 0–150)
TSH SERPL DL<=0.05 MIU/L-ACNC: 1.49 UIU/ML (ref 0.27–4.2)
VLDLC SERPL-MCNC: 18 MG/DL (ref 5–40)
WBC NRBC COR # BLD: 5.14 10*3/MM3 (ref 3.4–10.8)

## 2022-01-05 PROCEDURE — 80053 COMPREHEN METABOLIC PANEL: CPT | Performed by: FAMILY MEDICINE

## 2022-01-05 PROCEDURE — 80061 LIPID PANEL: CPT | Performed by: FAMILY MEDICINE

## 2022-01-05 PROCEDURE — 85027 COMPLETE CBC AUTOMATED: CPT | Performed by: FAMILY MEDICINE

## 2022-01-05 PROCEDURE — 84443 ASSAY THYROID STIM HORMONE: CPT | Performed by: FAMILY MEDICINE

## 2022-01-05 PROCEDURE — G0103 PSA SCREENING: HCPCS | Performed by: FAMILY MEDICINE

## 2022-01-05 PROCEDURE — 82306 VITAMIN D 25 HYDROXY: CPT | Performed by: FAMILY MEDICINE

## 2022-02-11 ENCOUNTER — OFFICE VISIT (OUTPATIENT)
Dept: CARDIOLOGY | Facility: CLINIC | Age: 67
End: 2022-02-11

## 2022-02-11 VITALS
BODY MASS INDEX: 27.98 KG/M2 | HEIGHT: 75 IN | WEIGHT: 225 LBS | HEART RATE: 105 BPM | OXYGEN SATURATION: 97 % | SYSTOLIC BLOOD PRESSURE: 117 MMHG | DIASTOLIC BLOOD PRESSURE: 81 MMHG

## 2022-02-11 DIAGNOSIS — I45.2 BIFASCICULAR BLOCK: ICD-10-CM

## 2022-02-11 DIAGNOSIS — E78.5 DYSLIPIDEMIA: ICD-10-CM

## 2022-02-11 DIAGNOSIS — R09.89 LABILE HYPERTENSION: Primary | ICD-10-CM

## 2022-02-11 PROCEDURE — 99214 OFFICE O/P EST MOD 30 MIN: CPT | Performed by: INTERNAL MEDICINE

## 2022-02-11 PROCEDURE — 93000 ELECTROCARDIOGRAM COMPLETE: CPT | Performed by: INTERNAL MEDICINE

## 2022-02-11 NOTE — PROGRESS NOTES
Subjective:     Encounter Date:02/11/2022    Patient ID: Christopher Raymond is a 66 y.o.  white male, manager of a [employer], from [place], [place].     PHYSICIAN: Reggie Monzon MD  OPHTHALMOLOGIST:  Unknown    Chief Complaint:   Chief Complaint   Patient presents with   • labile hypertension       Problem List:  1. Abnormal EKG with asymptomatic bifascicular block:  a. Abnormal EKG with chronic right bundle branch block with left anterior hemiblock.  b. Remote acceptable echocardiographic GXT, November 2007.  c. Residual class I symptoms with remote echocardiographic GXT.  d. Residual class I symptoms, February 2022  2. Remote tonsillectomy, 1967.  3. Hypertension - probably essential, with recent labile hypertensive blood pressure readings, 2019  4. Mildly overweight, BMI 27.5.  5. Dyslipidemia - 10 year ASCVD risk is 15.9% untreated, 7.5% with treatment, August 2020  6. Remote cataract surgery, 2016.  7. Fall with dislocated 5th digit right hand and laceration with suture repair, December 2020    Allergies   Allergen Reactions   • Penicillins Unknown (See Comments)     As a child   • Tramadol Hcl Nausea And Vomiting       Current Outpatient Medications:   •  Ascorbic Acid (VITAMIN C PO), Take 1,000 mg by mouth Daily., Disp: , Rfl:   •  aspirin 81 MG tablet, Take 81 mg by mouth daily., Disp: , Rfl:   •  Cholecalciferol (VITAMIN D) 2000 units tablet, Take 2,000 Units by mouth Daily., Disp: , Rfl:   •  Multiple Vitamins-Minerals (MULTIVITAMIN PO), Take 1 tablet by mouth Daily., Disp: , Rfl:   •  rosuvastatin (CRESTOR) 5 MG tablet, Take 1 tablet by mouth Daily., Disp: 90 tablet, Rfl: 3  •  telmisartan (MICARDIS) 40 MG tablet, TAKE ONE TABLET BY MOUTH DAILY, Disp: 90 tablet, Rfl: 3    History of Present Illness: Patient returns for scheduled 12-month follow up. He has been feeling well overall from a cardiovascular standpoint. He does experience occasional vague chest pain  "which is not anginal or persistent. Patient denies shortness of breath, palpitations, edema, dizziness, and syncope. He is active on a regular basis. He walks regularly, goes camping, and participates approximately every other week in a table tennis league. He has had no interim ER visits, hospitalizations, serious illnesses, or surgeries. He has received COVID immunization as well as the booster. Dr. Monzon has recommended he take CoQ10 due to tinnitus. He is unsure if his has helped. He states he has not been staying well hydrated on a regular basis. He continues to donate blood every 2 months.      ROS   Obtained and negative except as outlined in problem list and HPI.      ECG 12 Lead    Date/Time: 2/11/2022 10:05 AM  Performed by: Jaden Doran MD  Authorized by: Jaden Doran MD   Comparison: compared with previous ECG from 8/5/2020  Similar to previous ECG  Comparison to previous ECG: Elevated heart rate noted.  Rhythm: sinus tachycardia  BPM: 108  Conduction: right bundle branch block, left anterior fascicular block and bifascicular block    Clinical impression: abnormal EKG  Comments: QRS: 136 ms  QTc: 489 ms  KS: 150 ms               Objective:       Vitals:    02/11/22 0931 02/11/22 0932 02/11/22 0957   BP: 120/77 117/81    BP Location: Right arm Right arm    Patient Position: Sitting Standing    Pulse: 120 114 105   SpO2: 97% 97%    Weight: 102 kg (225 lb)     Height: 190.5 cm (75\")       Body mass index is 28.12 kg/m².  Last weight: 224 lbs    Vitals reviewed.   Constitutional:       Appearance: Well-developed.   Neck:      Thyroid: No thyromegaly.      Vascular: No carotid bruit or JVD.      Lymphadenopathy: No cervical adenopathy.   Pulmonary:      Effort: Pulmonary effort is normal.      Breath sounds: Normal breath sounds. No wheezing. No rhonchi. No rales.   Cardiovascular:      Regular rhythm.      Murmurs: There is a grade 1/6 mid frequency early systolic murmur at the LLSB.      No " gallop. No S3 gallop.   Pulses:     Dorsalis pedis: 2+ bilaterally.     Posterior tibial: 2+ bilaterally.  Edema:     Peripheral edema absent.   Abdominal:      Palpations: Abdomen is soft. There is no abdominal mass.      Tenderness: There is no abdominal tenderness.   Musculoskeletal: Normal range of motion. Skin:     General: Skin is warm and dry.      Findings: No rash.   Neurological:      Mental Status: Alert and oriented to person, place, and time.           Lab Review:   Lab Results   Component Value Date    GLUCOSE 100 (H) 01/05/2022    BUN 14 01/05/2022    CREATININE 0.98 01/05/2022    EGFRIFNONA 77 01/05/2022    BCR 14.3 01/05/2022     01/05/2022    K 4.3 01/05/2022     01/05/2022    CO2 24.5 01/05/2022    CALCIUM 9.1 01/05/2022    ALBUMIN 4.40 01/05/2022    AST 30 01/05/2022    ALT 37 01/05/2022       Lab Results   Component Value Date    WBC 5.14 01/05/2022    HGB 14.5 01/05/2022    HCT 43.6 01/05/2022    MCV 96.2 01/05/2022     01/05/2022       Lab Results   Component Value Date    TSH 1.490 01/05/2022       Lab Results   Component Value Date    CHOL 123 01/05/2022    CHOL 181 08/04/2020    CHOL 156 03/13/2017     Lab Results   Component Value Date    TRIG 96 01/05/2022    TRIG 90 08/12/2021    TRIG 74 05/05/2021     Lab Results   Component Value Date    HDL 32 (L) 01/05/2022    HDL 37 (L) 08/12/2021    HDL 27 (L) 05/05/2021     Lab Results   Component Value Date    LDL 73 01/05/2022    LDL 66 08/12/2021    LDL 48 05/05/2021             Assessment:       Overall continued acceptable course with no new interim cardiopulmonary complaints with good functional status. We will defer additional diagnostic or therapeutic intervention from a cardiac perspective at this time. His heart rate was tachycardic throughout his visit; we performed an EKG in the office today. He should stay well hydrated. There is no recent findings of anemia or thyroid dysfunction. He is asymptomatic. He is to  monitor his heart rate and if it consistently stays above 100/minute he is to let us know.     Diagnosis Plan   1. Labile hypertension  Well controlled. Continue current treatment.   2. Bifascicular block  Stable.   3. Dyslipidemia  Well controlled. Continue rosuvastatin.          Plan:         1. Patient to continue current medications and close follow up with the above providers.  2. Patient is encouraged to implement a regular aerobic exercise routine, at least 30 minutes daily, 4-5 days per week.  3. Tentative cardiology follow up in February 2023 or patient may return sooner PRN.    I, Landry Reis, attest that this documentation has been prepared under the direction and in the presence of Jaden Doran MD 02/11/2022    I, Jaden Doran MD, [facility], personally performed the services described in this documentation as scribed by the above named individual in my presence, and it is both accurate and complete. At 10:09 EST on 02/11/2022

## 2022-04-11 RX ORDER — TELMISARTAN 40 MG/1
TABLET ORAL
Qty: 90 TABLET | Refills: 3 | Status: SHIPPED | OUTPATIENT
Start: 2022-04-11 | End: 2022-08-18 | Stop reason: SDUPTHER

## 2022-04-14 ENCOUNTER — OFFICE VISIT (OUTPATIENT)
Dept: FAMILY MEDICINE CLINIC | Facility: CLINIC | Age: 67
End: 2022-04-14

## 2022-04-14 VITALS
SYSTOLIC BLOOD PRESSURE: 120 MMHG | BODY MASS INDEX: 28.23 KG/M2 | TEMPERATURE: 101 F | HEART RATE: 105 BPM | RESPIRATION RATE: 22 BRPM | DIASTOLIC BLOOD PRESSURE: 90 MMHG | WEIGHT: 227 LBS | OXYGEN SATURATION: 97 % | HEIGHT: 75 IN

## 2022-04-14 DIAGNOSIS — R05.9 COUGH: ICD-10-CM

## 2022-04-14 DIAGNOSIS — J01.00 ACUTE NON-RECURRENT MAXILLARY SINUSITIS: Primary | ICD-10-CM

## 2022-04-14 LAB
EXPIRATION DATE: NORMAL
EXPIRATION DATE: NORMAL
FLUAV AG NPH QL: NEGATIVE
FLUBV AG NPH QL: NEGATIVE
INTERNAL CONTROL: NORMAL
INTERNAL CONTROL: NORMAL
Lab: NORMAL
Lab: NORMAL
SARS-COV-2 AG UPPER RESP QL IA.RAPID: NOT DETECTED

## 2022-04-14 PROCEDURE — 87804 INFLUENZA ASSAY W/OPTIC: CPT | Performed by: NURSE PRACTITIONER

## 2022-04-14 PROCEDURE — 99213 OFFICE O/P EST LOW 20 MIN: CPT | Performed by: NURSE PRACTITIONER

## 2022-04-14 PROCEDURE — 87426 SARSCOV CORONAVIRUS AG IA: CPT | Performed by: NURSE PRACTITIONER

## 2022-04-14 RX ORDER — DOXYCYCLINE 100 MG/1
100 CAPSULE ORAL 2 TIMES DAILY
Qty: 20 CAPSULE | Refills: 0 | Status: SHIPPED | OUTPATIENT
Start: 2022-04-14 | End: 2022-04-24

## 2022-04-16 NOTE — PROGRESS NOTES
Follow Up Office Note     Patient Name: Christopher Raymond  : 1955   MRN: [id number]     Chief Complaint:    Chief Complaint   Patient presents with   • URI   • Cough       History of Present Illness: Christopher Raymond is a 66 y.o. male who presents today with complaint of sinus congestion, postnasal drainage and cough times several days.  He denies chills, body aches, loss of taste or smell.  He does have a fever today.  He has been fully vaccinated and boosted for COVID-19.      Subjective      Review of Systems:   Review of Systems   Constitutional: Positive for fatigue. Negative for activity change, appetite change, chills, diaphoresis and fever.   HENT: Positive for congestion, ear pain, postnasal drip, sinus pressure, sinus pain and sore throat. Negative for dental problem, ear discharge, facial swelling, rhinorrhea, tinnitus, trouble swallowing and voice change.    Eyes: Negative for redness and itching.   Respiratory: Positive for cough ( Mostly nonproductive but occasionally coughs up yellow-green mucus). Negative for choking, chest tightness, shortness of breath, wheezing and stridor.    Cardiovascular: Negative for chest pain, palpitations and leg swelling.   Gastrointestinal: Negative for abdominal pain, diarrhea, nausea and vomiting.   Musculoskeletal: Negative for arthralgias, myalgias, neck pain and neck stiffness.   Skin: Negative for rash.   Allergic/Immunologic: Positive for environmental allergies.   Neurological: Negative for dizziness and headaches.        Past Medical History:   Past Medical History:   Diagnosis Date   • Hematospermia    • Hypertension    • Sciatica        Medications:     Current Outpatient Medications:   •  Ascorbic Acid (VITAMIN C PO), Take 1,000 mg by mouth Daily., Disp: , Rfl:   •  aspirin 81 MG tablet, Take 81 mg by mouth daily., Disp: , Rfl:   •  Cholecalciferol (VITAMIN D) 2000 units tablet, Take 2,000 Units by mouth Daily., Disp: , Rfl:   •  Multiple  "Vitamins-Minerals (MULTIVITAMIN PO), Take 1 tablet by mouth Daily., Disp: , Rfl:   •  rosuvastatin (CRESTOR) 5 MG tablet, Take 1 tablet by mouth Daily., Disp: 90 tablet, Rfl: 3  •  telmisartan (MICARDIS) 40 MG tablet, TAKE ONE TABLET BY MOUTH DAILY, Disp: 90 tablet, Rfl: 3  •  doxycycline (MONODOX) 100 MG capsule, Take 1 capsule by mouth 2 (Two) Times a Day for 10 days., Disp: 20 capsule, Rfl: 0    Allergies:   Allergies   Allergen Reactions   • Penicillins Unknown (See Comments)     As a child   • Tramadol Hcl Nausea And Vomiting         Objective     Physical Exam:  Vital Signs:   Vitals:    04/14/22 1028   BP: 120/90   Pulse: 105   Resp: 22   Temp: (!) 101 °F (38.3 °C)   SpO2: 97%   Weight: 103 kg (227 lb)   Height: 190.5 cm (75\")   PainSc: 0-No pain     Body mass index is 28.37 kg/m².     Physical Exam  Vitals and nursing note reviewed.   Constitutional:       General: He is not in acute distress.     Appearance: Normal appearance. He is well-developed. He is not ill-appearing, toxic-appearing or diaphoretic.   HENT:      Head: Normocephalic and atraumatic.      Right Ear: External ear normal. A middle ear effusion is present. Tympanic membrane is injected and bulging.      Left Ear: External ear normal. A middle ear effusion is present. Tympanic membrane is injected and bulging.      Nose: Mucosal edema and congestion present.      Right Turbinates: Swollen.      Left Turbinates: Swollen.      Right Sinus: Maxillary sinus tenderness present.      Left Sinus: Maxillary sinus tenderness present.      Mouth/Throat:      Lips: Pink.      Mouth: Mucous membranes are moist.      Pharynx: Uvula midline. Posterior oropharyngeal erythema ( Moderate with cobblestoning) present.   Cardiovascular:      Rate and Rhythm: Normal rate and regular rhythm.      Heart sounds: Normal heart sounds.   Pulmonary:      Effort: Pulmonary effort is normal. No tachypnea or respiratory distress.      Breath sounds: Normal breath sounds. " No stridor. No wheezing.   Musculoskeletal:      Cervical back: Normal range of motion and neck supple. No rigidity. No muscular tenderness.   Lymphadenopathy:      Cervical: No cervical adenopathy.   Skin:     General: Skin is warm and dry.   Neurological:      Mental Status: He is alert and oriented to person, place, and time.   Psychiatric:         Mood and Affect: Mood normal.         Behavior: Behavior normal. Behavior is cooperative.         Thought Content: Thought content normal.         Judgment: Judgment normal.         Assessment / Plan      Assessment/Plan:   Diagnoses and all orders for this visit:    1. Acute non-recurrent maxillary sinusitis (Primary)  -     doxycycline (MONODOX) 100 MG capsule; Take 1 capsule by mouth 2 (Two) Times a Day for 10 days.  Dispense: 20 capsule; Refill: 0  -     POCT VERITOR SARS-CoV-2 Antigen  -     POC Influenza A / B    2. Cough  -     QUESTIONNAIRE SERIES       Lab Results   Component Value Date    RAPFLUA Negative 04/14/2022    RAPFLUB Negative 04/14/2022     POCT VERITOR SARS-CoV-2 Antigen (04/14/2022 13:17)    May take over-the-counter antihistamine as directed such as Claritin or Zyrtec and/or over-the-counter nasal steroid spray such as Flonase or Nasacort.    Follow Up:   PRN and at next scheduled appointment(s) with PCP.    Discussed the nature of the medical condition(s) risks, complications, implications, management, safe and proper use of medications. Encouraged medication compliance, and keeping scheduled follow up appointments with me and any other providers.      RTC if symptoms fail to improve, to ER if symptoms worsen.      LATONIA Siegel  [facility] [place]

## 2022-05-12 RX ORDER — ROSUVASTATIN CALCIUM 5 MG/1
TABLET, COATED ORAL
Qty: 90 TABLET | Refills: 3 | Status: SHIPPED | OUTPATIENT
Start: 2022-05-12 | End: 2022-08-15 | Stop reason: SDUPTHER

## 2022-08-15 RX ORDER — ROSUVASTATIN CALCIUM 5 MG/1
5 TABLET, COATED ORAL DAILY
Qty: 90 TABLET | Refills: 1 | Status: SHIPPED | OUTPATIENT
Start: 2022-08-15 | End: 2022-11-21 | Stop reason: SDUPTHER

## 2022-08-18 RX ORDER — TELMISARTAN 40 MG/1
40 TABLET ORAL DAILY
Qty: 90 TABLET | Refills: 3 | Status: SHIPPED | OUTPATIENT
Start: 2022-08-18 | End: 2022-11-21 | Stop reason: SDUPTHER

## 2022-11-21 RX ORDER — ROSUVASTATIN CALCIUM 5 MG/1
5 TABLET, COATED ORAL DAILY
Qty: 90 TABLET | Refills: 1 | Status: SHIPPED | OUTPATIENT
Start: 2022-11-21

## 2022-11-21 RX ORDER — TELMISARTAN 40 MG/1
40 TABLET ORAL DAILY
Qty: 90 TABLET | Refills: 3 | Status: SHIPPED | OUTPATIENT
Start: 2022-11-21

## 2023-01-13 ENCOUNTER — TELEPHONE (OUTPATIENT)
Dept: CARDIOLOGY | Facility: CLINIC | Age: 68
End: 2023-01-13
Payer: COMMERCIAL

## 2023-01-13 DIAGNOSIS — I10 ESSENTIAL HYPERTENSION: Primary | ICD-10-CM

## 2023-01-13 DIAGNOSIS — E78.01 FAMILIAL HYPERCHOLESTEROLEMIA: ICD-10-CM

## 2023-01-13 NOTE — TELEPHONE ENCOUNTER
Patient called and requested labs to be done before next appt with LATONIA Marie on 2/15/23.    Ok to order:  CBC  CMP  FLP    Any additional labs?    Please advise.

## 2023-02-06 ENCOUNTER — LAB (OUTPATIENT)
Dept: LAB | Facility: HOSPITAL | Age: 68
End: 2023-02-06
Payer: MEDICARE

## 2023-02-06 DIAGNOSIS — I10 ESSENTIAL HYPERTENSION: ICD-10-CM

## 2023-02-06 DIAGNOSIS — E78.01 FAMILIAL HYPERCHOLESTEROLEMIA: ICD-10-CM

## 2023-02-06 LAB
ALBUMIN SERPL-MCNC: 4.3 G/DL (ref 3.5–5.2)
ALBUMIN/GLOB SERPL: 1.7 G/DL
ALP SERPL-CCNC: 75 U/L (ref 39–117)
ALT SERPL W P-5'-P-CCNC: 39 U/L (ref 1–41)
ANION GAP SERPL CALCULATED.3IONS-SCNC: 7.5 MMOL/L (ref 5–15)
AST SERPL-CCNC: 32 U/L (ref 1–40)
BILIRUB SERPL-MCNC: 0.6 MG/DL (ref 0–1.2)
BUN SERPL-MCNC: 15 MG/DL (ref 8–23)
BUN/CREAT SERPL: 14.6 (ref 7–25)
CALCIUM SPEC-SCNC: 9.5 MG/DL (ref 8.6–10.5)
CHLORIDE SERPL-SCNC: 103 MMOL/L (ref 98–107)
CHOLEST SERPL-MCNC: 130 MG/DL (ref 0–200)
CO2 SERPL-SCNC: 25.5 MMOL/L (ref 22–29)
CREAT SERPL-MCNC: 1.03 MG/DL (ref 0.76–1.27)
DEPRECATED RDW RBC AUTO: 45.9 FL (ref 37–54)
EGFRCR SERPLBLD CKD-EPI 2021: 80.6 ML/MIN/1.73
ERYTHROCYTE [DISTWIDTH] IN BLOOD BY AUTOMATED COUNT: 13 % (ref 12.3–15.4)
GLOBULIN UR ELPH-MCNC: 2.6 GM/DL
GLUCOSE SERPL-MCNC: 95 MG/DL (ref 65–99)
HCT VFR BLD AUTO: 40.1 % (ref 37.5–51)
HDLC SERPL-MCNC: 33 MG/DL (ref 40–60)
HGB BLD-MCNC: 13.6 G/DL (ref 13–17.7)
LDLC SERPL CALC-MCNC: 79 MG/DL (ref 0–100)
LDLC/HDLC SERPL: 2.38 {RATIO}
MCH RBC QN AUTO: 33 PG (ref 26.6–33)
MCHC RBC AUTO-ENTMCNC: 33.9 G/DL (ref 31.5–35.7)
MCV RBC AUTO: 97.3 FL (ref 79–97)
PLATELET # BLD AUTO: 238 10*3/MM3 (ref 140–450)
PMV BLD AUTO: 9.5 FL (ref 6–12)
POTASSIUM SERPL-SCNC: 3.9 MMOL/L (ref 3.5–5.2)
PROT SERPL-MCNC: 6.9 G/DL (ref 6–8.5)
RBC # BLD AUTO: 4.12 10*6/MM3 (ref 4.14–5.8)
SODIUM SERPL-SCNC: 136 MMOL/L (ref 136–145)
TRIGL SERPL-MCNC: 92 MG/DL (ref 0–150)
TSH SERPL DL<=0.05 MIU/L-ACNC: 1.31 UIU/ML (ref 0.27–4.2)
VLDLC SERPL-MCNC: 18 MG/DL (ref 5–40)
WBC NRBC COR # BLD: 5.67 10*3/MM3 (ref 3.4–10.8)

## 2023-02-06 PROCEDURE — 80061 LIPID PANEL: CPT

## 2023-02-06 PROCEDURE — 80053 COMPREHEN METABOLIC PANEL: CPT

## 2023-02-06 PROCEDURE — 84443 ASSAY THYROID STIM HORMONE: CPT

## 2023-02-06 PROCEDURE — 36415 COLL VENOUS BLD VENIPUNCTURE: CPT

## 2023-02-06 PROCEDURE — 85027 COMPLETE CBC AUTOMATED: CPT

## 2023-04-24 RX ORDER — ROSUVASTATIN CALCIUM 5 MG/1
5 TABLET, COATED ORAL DAILY
Qty: 90 TABLET | Refills: 1 | Status: SHIPPED | OUTPATIENT
Start: 2023-04-24

## 2023-07-27 ENCOUNTER — HOSPITAL ENCOUNTER (OUTPATIENT)
Dept: CARDIOLOGY | Facility: HOSPITAL | Age: 68
Discharge: HOME | End: 2023-07-27
Admitting: NURSE PRACTITIONER
Payer: MEDICARE

## 2023-07-27 VITALS
WEIGHT: 229.28 LBS | HEIGHT: 74 IN | HEART RATE: 112 BPM | BODY MASS INDEX: 29.43 KG/M2 | SYSTOLIC BLOOD PRESSURE: 132 MMHG | OXYGEN SATURATION: 97 % | DIASTOLIC BLOOD PRESSURE: 78 MMHG

## 2023-07-27 DIAGNOSIS — I45.2 BIFASCICULAR BLOCK: ICD-10-CM

## 2023-07-27 DIAGNOSIS — R94.31 ABNORMAL EKG: ICD-10-CM

## 2023-07-27 LAB
BH CV ECHO MEAS - AO ROOT DIAM: 3.8 CM
BH CV ECHO MEAS - EDV(CUBED): 91.1 ML
BH CV ECHO MEAS - EDV(MOD-SP2): 129 ML
BH CV ECHO MEAS - EDV(MOD-SP4): 143 ML
BH CV ECHO MEAS - EF(MOD-BP): 61.2 %
BH CV ECHO MEAS - EF(MOD-SP2): 54.6 %
BH CV ECHO MEAS - EF(MOD-SP4): 64.5 %
BH CV ECHO MEAS - ESV(CUBED): 17.6 ML
BH CV ECHO MEAS - ESV(MOD-SP2): 58.6 ML
BH CV ECHO MEAS - ESV(MOD-SP4): 50.7 ML
BH CV ECHO MEAS - FS: 42.2 %
BH CV ECHO MEAS - IVS/LVPW: 1.18 CM
BH CV ECHO MEAS - IVSD: 1.3 CM
BH CV ECHO MEAS - LA DIMENSION: 3.9 CM
BH CV ECHO MEAS - LV DIASTOLIC VOL/BSA (35-75): 62.5 CM2
BH CV ECHO MEAS - LV MASS(C)D: 198.1 GRAMS
BH CV ECHO MEAS - LV SYSTOLIC VOL/BSA (12-30): 22.1 CM2
BH CV ECHO MEAS - LVIDD: 4.5 CM
BH CV ECHO MEAS - LVIDS: 2.6 CM
BH CV ECHO MEAS - LVPWD: 1.1 CM
BH CV ECHO MEAS - SI(MOD-SP2): 30.8 ML/M2
BH CV ECHO MEAS - SI(MOD-SP4): 40.3 ML/M2
BH CV ECHO MEAS - SV(MOD-SP2): 70.4 ML
BH CV ECHO MEAS - SV(MOD-SP4): 92.3 ML
BH CV STRESS BP STAGE 1: NORMAL
BH CV STRESS BP STAGE 2: NORMAL
BH CV STRESS BP STAGE 3: NORMAL
BH CV STRESS DURATION MIN STAGE 1: 3
BH CV STRESS DURATION MIN STAGE 2: 3
BH CV STRESS DURATION MIN STAGE 3: 2
BH CV STRESS DURATION SEC STAGE 1: 0
BH CV STRESS DURATION SEC STAGE 2: 0
BH CV STRESS DURATION SEC STAGE 3: 29
BH CV STRESS ECHO POST STRESS EJECTION FRACTION EF: 70 %
BH CV STRESS GRADE STAGE 1: 10
BH CV STRESS GRADE STAGE 2: 12
BH CV STRESS GRADE STAGE 3: 14
BH CV STRESS HR STAGE 1: 118
BH CV STRESS HR STAGE 2: 134
BH CV STRESS HR STAGE 3: 153
BH CV STRESS METS STAGE 1: 5
BH CV STRESS METS STAGE 2: 7.5
BH CV STRESS METS STAGE 3: 10
BH CV STRESS O2 STAGE 1: 94
BH CV STRESS O2 STAGE 2: 95
BH CV STRESS O2 STAGE 3: 91
BH CV STRESS PROTOCOL 1: NORMAL
BH CV STRESS RECOVERY BP: NORMAL MMHG
BH CV STRESS RECOVERY HR: 111 BPM
BH CV STRESS RECOVERY O2: 97 %
BH CV STRESS SPEED STAGE 1: 1.7
BH CV STRESS SPEED STAGE 2: 2.5
BH CV STRESS SPEED STAGE 3: 3.4
BH CV STRESS STAGE 1: 1
BH CV STRESS STAGE 2: 2
BH CV STRESS STAGE 3: 3
MAXIMAL PREDICTED HEART RATE: 155 BPM
PERCENT MAX PREDICTED HR: 98.71 %
STRESS BASELINE BP: NORMAL MMHG
STRESS BASELINE HR: 94 BPM
STRESS O2 SAT REST: 94 %
STRESS PERCENT HR: 116 %
STRESS POST ESTIMATED WORKLOAD: 10.1 METS
STRESS POST EXERCISE DUR MIN: 8 MIN
STRESS POST EXERCISE DUR SEC: 29 SEC
STRESS POST O2 SAT PEAK: 91 %
STRESS POST PEAK BP: NORMAL MMHG
STRESS POST PEAK HR: 153 BPM
STRESS TARGET HR: 132 BPM

## 2023-07-27 PROCEDURE — 25010000002 SULFUR HEXAFLUORIDE MICROSPH 60.7-25 MG RECONSTITUTED SUSPENSION: Performed by: NURSE PRACTITIONER

## 2023-07-27 PROCEDURE — 93017 CV STRESS TEST TRACING ONLY: CPT

## 2023-07-27 PROCEDURE — 93350 STRESS TTE ONLY: CPT

## 2023-07-27 RX ADMIN — SULFUR HEXAFLUORIDE 5 ML: KIT at 09:18

## 2023-10-18 RX ORDER — TELMISARTAN 40 MG/1
40 TABLET ORAL DAILY
Qty: 90 TABLET | Refills: 3 | Status: SHIPPED | OUTPATIENT
Start: 2023-10-18

## 2024-01-22 ENCOUNTER — OFFICE VISIT (OUTPATIENT)
Dept: FAMILY MEDICINE CLINIC | Facility: CLINIC | Age: 69
End: 2024-01-22
Payer: MEDICARE

## 2024-01-22 VITALS
TEMPERATURE: 98 F | RESPIRATION RATE: 21 BRPM | HEART RATE: 85 BPM | HEIGHT: 74 IN | DIASTOLIC BLOOD PRESSURE: 86 MMHG | BODY MASS INDEX: 29.18 KG/M2 | WEIGHT: 227.4 LBS | SYSTOLIC BLOOD PRESSURE: 124 MMHG | OXYGEN SATURATION: 98 %

## 2024-01-22 DIAGNOSIS — E78.01 FAMILIAL HYPERCHOLESTEROLEMIA: ICD-10-CM

## 2024-01-22 DIAGNOSIS — R73.09 ELEVATED HEMOGLOBIN A1C MEASUREMENT: ICD-10-CM

## 2024-01-22 DIAGNOSIS — I10 ESSENTIAL HYPERTENSION: Primary | ICD-10-CM

## 2024-01-22 DIAGNOSIS — B35.1 ONYCHOMYCOSIS: ICD-10-CM

## 2024-01-22 DIAGNOSIS — I45.2 BIFASCICULAR BLOCK: ICD-10-CM

## 2024-01-22 PROCEDURE — 1159F MED LIST DOCD IN RCRD: CPT | Performed by: FAMILY MEDICINE

## 2024-01-22 PROCEDURE — 3074F SYST BP LT 130 MM HG: CPT | Performed by: FAMILY MEDICINE

## 2024-01-22 PROCEDURE — 3079F DIAST BP 80-89 MM HG: CPT | Performed by: FAMILY MEDICINE

## 2024-01-22 PROCEDURE — 99214 OFFICE O/P EST MOD 30 MIN: CPT | Performed by: FAMILY MEDICINE

## 2024-01-22 PROCEDURE — 1160F RVW MEDS BY RX/DR IN RCRD: CPT | Performed by: FAMILY MEDICINE

## 2024-01-22 NOTE — PATIENT INSTRUCTIONS
Advance Care Planning and Advance Directives     You make decisions on a daily basis - decisions about where you want to live, your career, your home, your life. Perhaps one of the most important decisions you face is your choice for future medical care. Take time to talk with your family and your healthcare team and start planning today.  Advance Care Planning is a process that can help you:  Understand possible future healthcare decisions in light of your own experiences  Reflect on those decision in light of your goals and values  Discuss your decisions with those closest to you and the healthcare professionals that care for you  Make a plan by creating a document that reflects your wishes    Surrogate Decision Maker  In the event of a medical emergency, which has left you unable to communicate or to make your own decisions, you would need someone to make decisions for you.  It is important to discuss your preferences for medical treatment with this person while you are in good health.     Qualities of a surrogate decision maker:  Willing to take on this role and responsibility  Knows what you want for future medical care  Willing to follow your wishes even if they don't agree with them  Able to make difficult medical decisions under stressful circumstances    Advance Directives  These are legal documents you can create that will guide your healthcare team and decision maker(s) when needed. These documents can be stored in the electronic medical record.    Living Will - a legal document to guide your care if you have a terminal condition or a serious illness and are unable to communicate. States vary by statute in document names/types, but most forms may include one or more of the following:        -  Directions regarding life-prolonging treatments        -  Directions regarding artificially provided nutrition/hydration        -  Choosing a healthcare decision maker        -  Direction regarding organ/tissue  donation    Durable Power of  for Healthcare - this document names an -in-fact to make medical decisions for you, but it may also allow this person to make personal and financial decisions for you. Please seek the advice of an  if you need this type of document.    **Advance Directives are not required and no one may discriminate against you if you do not sign one.    Medical Orders  Many states allow specific forms/orders signed by your physician to record your wishes for medical treatment in your current state of health. This form, signed in personal communication with your physician, addresses resuscitation and other medical interventions that you may or may not want.      For more information or to schedule a time with a [facility] Advance Care Planning Facilitator contact: [email or web address] or call [phone] and someone will contact you directly.

## 2024-01-22 NOTE — PROGRESS NOTES
Subjective   Christopher Raymond is a 68 y.o. male.     History of Present Illness this is a new patient here to establish care with me today looks like he has a history of bifascicular block and is followed by cardiology.  I have reviewed his most recent cardiology records from 7/27/2023.  He has hypertension hyperlipidemia I have reviewed his most recent labs from February 6, 2023.    His last Medicare wellness was in June 2023.    He had echo 7/23 EF 61% trace aortic regurg and trace mitral regurg.      Brother passed in march was diabetic and not active. He has family history of diabetes.      He is active and feels well he has gained some weight plays competitive table tennis. Retired at [employer] and now working full time at software company and was  since November.      Has some left tinnits and had some left side hearing loss worse since statin and started co q 10, that has helped with ringing in ears.      Right 1st great nail fungus.     He does see derm one time yearly.     Had c-scope less than a year    He does take metamucil and christoph lax daily.    The following portions of the patient's history were reviewed and updated as appropriate: allergies, current medications, past family history, past medical history, past social history, past surgical history, and problem list.    Review of Systems   Constitutional:  Negative for chills, fatigue, fever and unexpected weight change.   HENT:  Negative for congestion, ear pain, nosebleeds, rhinorrhea, sinus pressure, sneezing, sore throat and trouble swallowing.    Eyes:  Negative for itching and visual disturbance.   Respiratory:  Negative for cough, chest tightness, shortness of breath and wheezing.    Cardiovascular:  Negative for chest pain, palpitations and leg swelling.   Gastrointestinal:  Negative for abdominal pain, anal bleeding, blood in stool, constipation, diarrhea, nausea and vomiting.   Endocrine: Negative for cold intolerance, heat intolerance,  "polydipsia and polyuria.   Genitourinary:  Negative for difficulty urinating, frequency, hematuria and urgency.   Musculoskeletal:  Negative for back pain, gait problem and myalgias.   Skin:  Negative for rash and wound.   Neurological:  Negative for dizziness, weakness, numbness and headaches.   Hematological:  Negative for adenopathy. Does not bruise/bleed easily.   Psychiatric/Behavioral:  Negative for agitation, confusion, decreased concentration and suicidal ideas. The patient is not nervous/anxious.        Objective     Vitals:    01/22/24 0919   BP: 124/86   BP Location: Right arm   Patient Position: Sitting   Cuff Size: Adult   Pulse: 85   Resp: 21   Temp: 98 °F (36.7 °C)   TempSrc: Infrared   SpO2: 98%   Weight: 103 kg (227 lb 6.4 oz)   Height: 188 cm (74.02\")       Physical Exam  Vitals and nursing note reviewed.   Constitutional:       Appearance: He is well-developed.   HENT:      Head: Normocephalic and atraumatic.   Eyes:      General: No scleral icterus.        Right eye: No discharge.         Left eye: No discharge.      Conjunctiva/sclera: Conjunctivae normal.      Pupils: Pupils are equal, round, and reactive to light.   Neck:      Thyroid: No thyromegaly.      Vascular: No JVD.      Trachea: No tracheal deviation.   Cardiovascular:      Rate and Rhythm: Normal rate and regular rhythm.      Heart sounds: Normal heart sounds. No murmur heard.     No friction rub. No gallop.   Pulmonary:      Effort: Pulmonary effort is normal. No respiratory distress.      Breath sounds: Normal breath sounds. No wheezing or rales.   Chest:      Chest wall: No tenderness.   Abdominal:      General: Bowel sounds are normal. There is no distension.      Palpations: Abdomen is soft. There is no mass.      Tenderness: There is no abdominal tenderness.   Musculoskeletal:         General: Normal range of motion.      Cervical back: Normal range of motion and neck supple.   Skin:     General: Skin is warm and dry. "   Neurological:      Mental Status: He is alert and oriented to person, place, and time.      Cranial Nerves: No cranial nerve deficit.      Coordination: Coordination normal.      Deep Tendon Reflexes: Reflexes are normal and symmetric. Reflexes normal.   Psychiatric:         Behavior: Behavior normal.         Thought Content: Thought content normal.         Judgment: Judgment normal.         Assessment & Plan     Problem List Items Addressed This Visit          Cardiac and Vasculature    Bifascicular block    Overview     Abnormal EKG ; asymptomatic.    Abnormal EKG with chronic right bundle branch block with left anterior hemiblock.  Remote acceptable echocardiographic GXT, November 2007.  Residual class I symptoms with acceptable echocardiographic GXT.         Hyperlipidemia    Relevant Orders    Lipid Panel    Essential hypertension - Primary    Relevant Orders    CBC & Differential    Comprehensive Metabolic Panel    Urinalysis With Culture If Indicated - Urine, Clean Catch     Other Visit Diagnoses       Elevated hemoglobin A1c measurement        Relevant Orders    Hemoglobin A1c    Onychomycosis        Relevant Orders    Ambulatory Referral to Podiatry          Continue with physical activity  Keeping weight down  Refer podiatry  Check fasting labs.    Check A1c today    Reviewed recent card echo and records.   He Is using fitbit.    Co q 10 will try to increase to 200 to see if that helps with ringing in ears.   He will continue supplement.    He reports his feet will occasionally feel some tinging in feet at times. He is using inserts for plantar fascitis  Fu 6 mo

## 2024-02-09 RX ORDER — ROSUVASTATIN CALCIUM 5 MG/1
5 TABLET, COATED ORAL DAILY
Qty: 90 TABLET | Refills: 1 | Status: SHIPPED | OUTPATIENT
Start: 2024-02-09

## 2024-02-19 ENCOUNTER — LAB (OUTPATIENT)
Dept: LAB | Facility: HOSPITAL | Age: 69
End: 2024-02-19
Payer: MEDICARE

## 2024-02-19 DIAGNOSIS — I10 ESSENTIAL HYPERTENSION: ICD-10-CM

## 2024-02-19 DIAGNOSIS — E78.01 FAMILIAL HYPERCHOLESTEROLEMIA: ICD-10-CM

## 2024-02-19 DIAGNOSIS — R73.09 ELEVATED HEMOGLOBIN A1C MEASUREMENT: ICD-10-CM

## 2024-02-19 LAB
ALBUMIN SERPL-MCNC: 3.9 G/DL (ref 3.5–5.2)
ALBUMIN/GLOB SERPL: 1.6 G/DL
ALP SERPL-CCNC: 80 U/L (ref 39–117)
ALT SERPL W P-5'-P-CCNC: 31 U/L (ref 1–41)
ANION GAP SERPL CALCULATED.3IONS-SCNC: 12.6 MMOL/L (ref 5–15)
AST SERPL-CCNC: 31 U/L (ref 1–40)
BASOPHILS # BLD AUTO: 0.03 10*3/MM3 (ref 0–0.2)
BASOPHILS NFR BLD AUTO: 0.6 % (ref 0–1.5)
BILIRUB SERPL-MCNC: 0.5 MG/DL (ref 0–1.2)
BILIRUB UR QL STRIP: NEGATIVE
BUN SERPL-MCNC: 12 MG/DL (ref 8–23)
BUN/CREAT SERPL: 11.4 (ref 7–25)
CALCIUM SPEC-SCNC: 9.2 MG/DL (ref 8.6–10.5)
CHLORIDE SERPL-SCNC: 105 MMOL/L (ref 98–107)
CHOLEST SERPL-MCNC: 123 MG/DL (ref 0–200)
CLARITY UR: CLEAR
CO2 SERPL-SCNC: 24.4 MMOL/L (ref 22–29)
COLOR UR: YELLOW
CREAT SERPL-MCNC: 1.05 MG/DL (ref 0.76–1.27)
DEPRECATED RDW RBC AUTO: 41 FL (ref 37–54)
EGFRCR SERPLBLD CKD-EPI 2021: 78.3 ML/MIN/1.73
EOSINOPHIL # BLD AUTO: 0.23 10*3/MM3 (ref 0–0.4)
EOSINOPHIL NFR BLD AUTO: 4.4 % (ref 0.3–6.2)
ERYTHROCYTE [DISTWIDTH] IN BLOOD BY AUTOMATED COUNT: 12.1 % (ref 12.3–15.4)
GLOBULIN UR ELPH-MCNC: 2.5 GM/DL
GLUCOSE SERPL-MCNC: 104 MG/DL (ref 65–99)
GLUCOSE UR STRIP-MCNC: NEGATIVE MG/DL
HBA1C MFR BLD: 5.8 % (ref 4.8–5.6)
HCT VFR BLD AUTO: 41.7 % (ref 37.5–51)
HDLC SERPL-MCNC: 35 MG/DL (ref 40–60)
HGB BLD-MCNC: 14 G/DL (ref 13–17.7)
HGB UR QL STRIP.AUTO: NEGATIVE
HOLD SPECIMEN: NORMAL
IMM GRANULOCYTES # BLD AUTO: 0.01 10*3/MM3 (ref 0–0.05)
IMM GRANULOCYTES NFR BLD AUTO: 0.2 % (ref 0–0.5)
KETONES UR QL STRIP: NEGATIVE
LDLC SERPL CALC-MCNC: 72 MG/DL (ref 0–100)
LDLC/HDLC SERPL: 2.06 {RATIO}
LEUKOCYTE ESTERASE UR QL STRIP.AUTO: NEGATIVE
LYMPHOCYTES # BLD AUTO: 1.81 10*3/MM3 (ref 0.7–3.1)
LYMPHOCYTES NFR BLD AUTO: 34.7 % (ref 19.6–45.3)
MCH RBC QN AUTO: 31.3 PG (ref 26.6–33)
MCHC RBC AUTO-ENTMCNC: 33.6 G/DL (ref 31.5–35.7)
MCV RBC AUTO: 93.3 FL (ref 79–97)
MONOCYTES # BLD AUTO: 0.59 10*3/MM3 (ref 0.1–0.9)
MONOCYTES NFR BLD AUTO: 11.3 % (ref 5–12)
NEUTROPHILS NFR BLD AUTO: 2.55 10*3/MM3 (ref 1.7–7)
NEUTROPHILS NFR BLD AUTO: 48.8 % (ref 42.7–76)
NITRITE UR QL STRIP: NEGATIVE
NRBC BLD AUTO-RTO: 0 /100 WBC (ref 0–0.2)
PH UR STRIP.AUTO: 6 [PH] (ref 5–8)
PLATELET # BLD AUTO: 218 10*3/MM3 (ref 140–450)
PMV BLD AUTO: 9.5 FL (ref 6–12)
POTASSIUM SERPL-SCNC: 4.2 MMOL/L (ref 3.5–5.2)
PROT SERPL-MCNC: 6.4 G/DL (ref 6–8.5)
PROT UR QL STRIP: NEGATIVE
RBC # BLD AUTO: 4.47 10*6/MM3 (ref 4.14–5.8)
SODIUM SERPL-SCNC: 142 MMOL/L (ref 136–145)
SP GR UR STRIP: 1.02 (ref 1–1.03)
TRIGL SERPL-MCNC: 80 MG/DL (ref 0–150)
UROBILINOGEN UR QL STRIP: NORMAL
VLDLC SERPL-MCNC: 16 MG/DL (ref 5–40)
WBC NRBC COR # BLD AUTO: 5.22 10*3/MM3 (ref 3.4–10.8)

## 2024-02-19 PROCEDURE — 81003 URINALYSIS AUTO W/O SCOPE: CPT

## 2024-02-19 PROCEDURE — 80053 COMPREHEN METABOLIC PANEL: CPT

## 2024-02-19 PROCEDURE — 83036 HEMOGLOBIN GLYCOSYLATED A1C: CPT

## 2024-02-19 PROCEDURE — 85025 COMPLETE CBC W/AUTO DIFF WBC: CPT

## 2024-02-19 PROCEDURE — 80061 LIPID PANEL: CPT

## 2024-02-26 DIAGNOSIS — B35.1 ONYCHOMYCOSIS: Primary | ICD-10-CM

## 2024-02-26 RX ORDER — FLUCONAZOLE 150 MG/1
150 TABLET ORAL WEEKLY
Qty: 12 TABLET | Refills: 3 | Status: SHIPPED | OUTPATIENT
Start: 2024-02-26

## 2024-04-10 DIAGNOSIS — B35.1 ONYCHOMYCOSIS: ICD-10-CM

## 2024-04-10 RX ORDER — FLUCONAZOLE 150 MG/1
150 TABLET ORAL WEEKLY
Qty: 12 TABLET | Refills: 3 | Status: SHIPPED | OUTPATIENT
Start: 2024-04-10

## 2024-04-10 NOTE — TELEPHONE ENCOUNTER
Rx Refill Note  Requested Prescriptions     Pending Prescriptions Disp Refills    fluconazole (DIFLUCAN) 150 MG tablet 12 tablet 3     Sig: Take 1 tablet by mouth 1 (One) Time Per Week.      Last office visit with prescribing clinician: 1/22/2024   Last telemedicine visit with prescribing clinician: Visit date not found   Next office visit with prescribing clinician: 7/22/2024                         Would you like a call back once the refill request has been completed: [] Yes [] No    If the office needs to give you a call back, can they leave a voicemail: [] Yes [] No    Michelle Monk MA  04/10/24, 09:25 EDT

## 2024-04-12 RX ORDER — TELMISARTAN 40 MG/1
40 TABLET ORAL DAILY
Qty: 90 TABLET | Refills: 3 | Status: SHIPPED | OUTPATIENT
Start: 2024-04-12

## 2024-04-12 RX ORDER — ROSUVASTATIN CALCIUM 5 MG/1
5 TABLET, COATED ORAL DAILY
Qty: 90 TABLET | Refills: 3 | Status: SHIPPED | OUTPATIENT
Start: 2024-04-12

## 2024-06-26 ENCOUNTER — TELEPHONE (OUTPATIENT)
Dept: CARDIOLOGY | Facility: CLINIC | Age: 69
End: 2024-06-26
Payer: MEDICARE

## 2024-06-26 NOTE — TELEPHONE ENCOUNTER
"    Caller: Christopher Raymond \"Jimy\"    Relationship to patient: Self    Best call back number: [phone]    Chief complaint: PATIENT WILL BE OUT OF TOWN FOR THE SUMMER. HE WONT BE ABLE TO MAKE HIS APPOINTMENT ON 07.10.24. HE WOULD LIKE TO RESCHEDULE FOR SOMETIME IN NOVEMBER. HUB NEEDS FURTHER DIRECTION TO COMPLETE TASK. PLEASE REACH OUT TO SCHEDULE PATIENT.     Type of visit: FOLLOW UP    Requested date: NOVEMBER    If rescheduling, when is the original appointment: 07.10.24    Additional notes:NO NEW OR WORSENING CONCERNS          "

## 2024-10-07 ENCOUNTER — LAB (OUTPATIENT)
Dept: LAB | Facility: HOSPITAL | Age: 69
End: 2024-10-07
Payer: MEDICARE

## 2024-10-07 ENCOUNTER — OFFICE VISIT (OUTPATIENT)
Dept: FAMILY MEDICINE CLINIC | Facility: CLINIC | Age: 69
End: 2024-10-07
Payer: MEDICARE

## 2024-10-07 VITALS
SYSTOLIC BLOOD PRESSURE: 110 MMHG | TEMPERATURE: 98.4 F | WEIGHT: 220.2 LBS | OXYGEN SATURATION: 98 % | DIASTOLIC BLOOD PRESSURE: 66 MMHG | RESPIRATION RATE: 20 BRPM | BODY MASS INDEX: 28.26 KG/M2 | HEART RATE: 82 BPM

## 2024-10-07 DIAGNOSIS — R73.09 ELEVATED HEMOGLOBIN A1C MEASUREMENT: ICD-10-CM

## 2024-10-07 DIAGNOSIS — I45.2 BIFASCICULAR BLOCK: ICD-10-CM

## 2024-10-07 DIAGNOSIS — I10 ESSENTIAL HYPERTENSION: ICD-10-CM

## 2024-10-07 DIAGNOSIS — R73.09 OTHER ABNORMAL GLUCOSE: ICD-10-CM

## 2024-10-07 DIAGNOSIS — E78.01 FAMILIAL HYPERCHOLESTEROLEMIA: ICD-10-CM

## 2024-10-07 DIAGNOSIS — Z00.00 MEDICARE ANNUAL WELLNESS VISIT, SUBSEQUENT: Primary | ICD-10-CM

## 2024-10-07 DIAGNOSIS — B35.1 ONYCHOMYCOSIS: ICD-10-CM

## 2024-10-07 DIAGNOSIS — E55.9 VITAMIN D DEFICIENCY: ICD-10-CM

## 2024-10-07 DIAGNOSIS — H93.13 RINGING IN EARS, BILATERAL: ICD-10-CM

## 2024-10-07 LAB
25(OH)D3 SERPL-MCNC: 46.2 NG/ML (ref 30–100)
ALBUMIN SERPL-MCNC: 4.1 G/DL (ref 3.5–5.2)
ALBUMIN/GLOB SERPL: 1.4 G/DL
ALP SERPL-CCNC: 84 U/L (ref 39–117)
ALT SERPL W P-5'-P-CCNC: 28 U/L (ref 1–41)
ANION GAP SERPL CALCULATED.3IONS-SCNC: 12.3 MMOL/L (ref 5–15)
AST SERPL-CCNC: 34 U/L (ref 1–40)
BASOPHILS # BLD AUTO: 0.02 10*3/MM3 (ref 0–0.2)
BASOPHILS NFR BLD AUTO: 0.3 % (ref 0–1.5)
BILIRUB SERPL-MCNC: 0.5 MG/DL (ref 0–1.2)
BUN SERPL-MCNC: 18 MG/DL (ref 8–23)
BUN/CREAT SERPL: 18.9 (ref 7–25)
CALCIUM SPEC-SCNC: 9.3 MG/DL (ref 8.6–10.5)
CHLORIDE SERPL-SCNC: 105 MMOL/L (ref 98–107)
CHOLEST SERPL-MCNC: 109 MG/DL (ref 0–200)
CO2 SERPL-SCNC: 22.7 MMOL/L (ref 22–29)
CREAT SERPL-MCNC: 0.95 MG/DL (ref 0.76–1.27)
DEPRECATED RDW RBC AUTO: 45.3 FL (ref 37–54)
EGFRCR SERPLBLD CKD-EPI 2021: 88.3 ML/MIN/1.73
EOSINOPHIL # BLD AUTO: 0.24 10*3/MM3 (ref 0–0.4)
EOSINOPHIL NFR BLD AUTO: 4 % (ref 0.3–6.2)
ERYTHROCYTE [DISTWIDTH] IN BLOOD BY AUTOMATED COUNT: 12.6 % (ref 12.3–15.4)
GLOBULIN UR ELPH-MCNC: 3 GM/DL
GLUCOSE SERPL-MCNC: 91 MG/DL (ref 65–99)
HBA1C MFR BLD: 5.4 % (ref 4.8–5.6)
HCT VFR BLD AUTO: 42.4 % (ref 37.5–51)
HDLC SERPL-MCNC: 29 MG/DL (ref 40–60)
HGB BLD-MCNC: 14.2 G/DL (ref 13–17.7)
IMM GRANULOCYTES # BLD AUTO: 0.02 10*3/MM3 (ref 0–0.05)
IMM GRANULOCYTES NFR BLD AUTO: 0.3 % (ref 0–0.5)
LDLC SERPL CALC-MCNC: 65 MG/DL (ref 0–100)
LDLC/HDLC SERPL: 2.29 {RATIO}
LYMPHOCYTES # BLD AUTO: 1.24 10*3/MM3 (ref 0.7–3.1)
LYMPHOCYTES NFR BLD AUTO: 20.6 % (ref 19.6–45.3)
MCH RBC QN AUTO: 33.2 PG (ref 26.6–33)
MCHC RBC AUTO-ENTMCNC: 33.5 G/DL (ref 31.5–35.7)
MCV RBC AUTO: 99.1 FL (ref 79–97)
MONOCYTES # BLD AUTO: 0.44 10*3/MM3 (ref 0.1–0.9)
MONOCYTES NFR BLD AUTO: 7.3 % (ref 5–12)
NEUTROPHILS NFR BLD AUTO: 4.06 10*3/MM3 (ref 1.7–7)
NEUTROPHILS NFR BLD AUTO: 67.5 % (ref 42.7–76)
NRBC BLD AUTO-RTO: 0 /100 WBC (ref 0–0.2)
PLATELET # BLD AUTO: 245 10*3/MM3 (ref 140–450)
PMV BLD AUTO: 9.4 FL (ref 6–12)
POTASSIUM SERPL-SCNC: 4 MMOL/L (ref 3.5–5.2)
PROT SERPL-MCNC: 7.1 G/DL (ref 6–8.5)
RBC # BLD AUTO: 4.28 10*6/MM3 (ref 4.14–5.8)
SODIUM SERPL-SCNC: 140 MMOL/L (ref 136–145)
TRIGL SERPL-MCNC: 68 MG/DL (ref 0–150)
TSH SERPL DL<=0.05 MIU/L-ACNC: 0.99 UIU/ML (ref 0.27–4.2)
VLDLC SERPL-MCNC: 15 MG/DL (ref 5–40)
WBC NRBC COR # BLD AUTO: 6.02 10*3/MM3 (ref 3.4–10.8)

## 2024-10-07 PROCEDURE — G0439 PPPS, SUBSEQ VISIT: HCPCS | Performed by: FAMILY MEDICINE

## 2024-10-07 PROCEDURE — 84443 ASSAY THYROID STIM HORMONE: CPT

## 2024-10-07 PROCEDURE — 3078F DIAST BP <80 MM HG: CPT | Performed by: FAMILY MEDICINE

## 2024-10-07 PROCEDURE — 82306 VITAMIN D 25 HYDROXY: CPT

## 2024-10-07 PROCEDURE — 1126F AMNT PAIN NOTED NONE PRSNT: CPT | Performed by: FAMILY MEDICINE

## 2024-10-07 PROCEDURE — 3074F SYST BP LT 130 MM HG: CPT | Performed by: FAMILY MEDICINE

## 2024-10-07 PROCEDURE — 85025 COMPLETE CBC W/AUTO DIFF WBC: CPT

## 2024-10-07 PROCEDURE — 1170F FXNL STATUS ASSESSED: CPT | Performed by: FAMILY MEDICINE

## 2024-10-07 PROCEDURE — 1160F RVW MEDS BY RX/DR IN RCRD: CPT | Performed by: FAMILY MEDICINE

## 2024-10-07 PROCEDURE — 83036 HEMOGLOBIN GLYCOSYLATED A1C: CPT

## 2024-10-07 PROCEDURE — 1159F MED LIST DOCD IN RCRD: CPT | Performed by: FAMILY MEDICINE

## 2024-10-07 PROCEDURE — 80053 COMPREHEN METABOLIC PANEL: CPT

## 2024-10-07 PROCEDURE — 80061 LIPID PANEL: CPT

## 2024-10-07 RX ORDER — ROSUVASTATIN CALCIUM 5 MG/1
5 TABLET, COATED ORAL DAILY
Qty: 90 TABLET | Refills: 3 | Status: SHIPPED | OUTPATIENT
Start: 2024-10-07

## 2024-10-07 RX ORDER — TELMISARTAN 40 MG/1
40 TABLET ORAL DAILY
Qty: 90 TABLET | Refills: 3 | Status: SHIPPED | OUTPATIENT
Start: 2024-10-07

## 2024-10-07 RX ORDER — CHOLECALCIFEROL (VITAMIN D3) 50 MCG
2000 TABLET ORAL DAILY
Qty: 90 TABLET | Refills: 3 | Status: SHIPPED | OUTPATIENT
Start: 2024-10-07

## 2024-10-07 RX ORDER — DIPHENOXYLATE HYDROCHLORIDE AND ATROPINE SULFATE 2.5; .025 MG/1; MG/1
1 TABLET ORAL DAILY
Qty: 90 TABLET | Refills: 3 | Status: SHIPPED | OUTPATIENT
Start: 2024-10-07

## 2024-10-07 RX ORDER — FLUCONAZOLE 150 MG/1
150 TABLET ORAL WEEKLY
Qty: 12 TABLET | Refills: 3 | Status: SHIPPED | OUTPATIENT
Start: 2024-10-07

## 2024-10-07 NOTE — PATIENT INSTRUCTIONS
Medicare Wellness  Personal Prevention Plan of Service     Date of Office Visit:    Encounter Provider:  Kavitha Kraft MD  Place of Service:  [facility]  Patient Name: Christopher Raymond  :  1955    As part of the Medicare Wellness portion of your visit today, we are providing you with this personalized preventive plan of services (PPPS). This plan is based upon recommendations of the [place] Preventive Services Task Force (USPSTF) and the Advisory Committee on Immunization Practices (ACIP).    This lists the preventive care services that should be considered, and provides dates of when you are due. Items listed as completed are up-to-date and do not require any further intervention.    Health Maintenance   Topic Date Due    ANNUAL WELLNESS VISIT  2024    BMI FOLLOWUP  2024    COVID-19 Vaccine ( season) 2025    LIPID PANEL  2025    TDAP/TD VACCINES (3 - Td or Tdap) 2029    COLORECTAL CANCER SCREENING  2032    HEPATITIS C SCREENING  Completed    INFLUENZA VACCINE  Completed    Pneumococcal Vaccine 65+  Completed    ZOSTER VACCINE  Completed       Orders Placed This Encounter   Procedures    TSH     Standing Status:   Future     Order Specific Question:   Release to patient     Answer:   Routine Release [[id number]]    Comprehensive Metabolic Panel     Standing Status:   Future     Order Specific Question:   Release to patient     Answer:   Routine Release [[id number]]    Hemoglobin A1c     Standing Status:   Future     Order Specific Question:   Release to patient     Answer:   Routine Release [[id number]]    Lipid Panel     Standing Status:   Future     Order Specific Question:   Release to patient     Answer:   Routine Release [[id number]]    Vitamin D,25-Hydroxy     Standing Status:   Future     Order Specific Question:   Release to patient     Answer:   Routine Release [[id number]]    CBC & Differential      Standing Status:   Future     Order Specific Question:   Manual Differential     Answer:   No     Order Specific Question:   Release to patient     Answer:   Routine Release [[id number]]       No follow-ups on file.

## 2024-10-07 NOTE — ASSESSMENT & PLAN NOTE
Hypertension is stable and controlled  Continue current treatment regimen.  Medication changes per orders.  Blood pressure will be reassessed in 6 months.   melinda Lantigua

## 2024-10-07 NOTE — PROGRESS NOTES
Subjective   The ABCs of the Annual Wellness Visit  Medicare Wellness Visit      Christopher Raymond is a 68 y.o. patient who presents for a Medicare Wellness Visit.    The following portions of the patient's history were reviewed and   updated as appropriate: allergies, current medications, past family history, past medical history, past social history, past surgical history, and problem list.    Compared to one year ago, the patient's physical   health is better.  Compared to one year ago, the patient's mental   health is the same.      Brother khoi  of MI, father had heart attack, sister getting valve replacement today.  Younger sister aortic issues.    Followed with cardiology.  3/4 siblings have aortic issues.     Recent Hospitalizations:  He was not admitted to the hospital during the last year.     Current Medical Providers:  Patient Care Team:  Kavitha Kraft MD as PCP - General (Family Medicine)  Jaden Doran MD as PCP - Internal Medicine (Cardiology)    Outpatient Medications Prior to Visit   Medication Sig Dispense Refill    Ascorbic Acid (VITAMIN C PO) Take 1,000 mg by mouth Daily.      aspirin 81 MG tablet Take 1 tablet by mouth Daily.      Cholecalciferol (VITAMIN D) 2000 units tablet Take 1 tablet by mouth Daily.      Coenzyme Q10 (CoQ-10) 100 MG capsule       fluconazole (DIFLUCAN) 150 MG tablet Take 1 tablet by mouth 1 (One) Time Per Week. 12 tablet 3    Multiple Vitamins-Minerals (MULTIVITAMIN PO) Take 1 tablet by mouth Daily.      rosuvastatin (CRESTOR) 5 MG tablet Take 1 tablet by mouth Daily. 90 tablet 3    telmisartan (MICARDIS) 40 MG tablet Take 1 tablet by mouth Daily. 90 tablet 3     No facility-administered medications prior to visit.     No opioid medication identified on active medication list. I have reviewed chart for other potential  high risk medication/s and harmful drug interactions in the elderly.      Aspirin is on active medication list. Aspirin use is indicated based  "on review of current medical condition/s. Pros and cons of this therapy have been discussed today. Benefits of this medication outweigh potential harm.  Patient has been encouraged to continue taking this medication.  .      Patient Active Problem List   Diagnosis    Bifascicular block    Hyperlipidemia    Abnormal EKG    Pain of toe of left foot    Essential hypertension     Advance Care Planning Advance Directive is not on file.  ACP discussion was held with the patient during this visit. Patient does not have an advance directive, information provided.            Objective   Vitals:    10/07/24 0812   BP: 110/66   BP Location: Left arm   Patient Position: Sitting   Cuff Size: Adult   Pulse: 82   Resp: 20   Temp: 98.4 °F (36.9 °C)   TempSrc: Infrared   SpO2: 98%   Weight: 99.9 kg (220 lb 3.2 oz)       Estimated body mass index is 28.26 kg/m² as calculated from the following:    Height as of 1/22/24: 188 cm (74.02\").    Weight as of this encounter: 99.9 kg (220 lb 3.2 oz).    BMI is >= 25 and <30. (Overweight) The following options were offered after discussion;: weight loss educational material (shared in after visit summary), exercise counseling/recommendations, and nutrition counseling/recommendations       Does the patient have evidence of cognitive impairment? No                                                                                                Health  Risk Assessment    Smoking Status:  Social History     Tobacco Use   Smoking Status Never    Passive exposure: Never   Smokeless Tobacco Never   Tobacco Comments    Never smoked     Alcohol Consumption:  Social History     Substance and Sexual Activity   Alcohol Use Yes    Alcohol/week: 1.0 - 2.0 standard drink of alcohol    Types: 1 - 2 Cans of beer per week    Comment: No drinking for weeks/months. Occasionally binge drink       Fall Risk Screen  STEADI Fall Risk Assessment was completed, and patient is at MODERATE risk for falls. Assessment " completed on:2024    Depression Screenin/22/2024     9:20 AM   PHQ-2/PHQ-9 Depression Screening   Little Interest or Pleasure in Doing Things 0-->not at all   Feeling Down, Depressed or Hopeless 0-->not at all   PHQ-9: Brief Depression Severity Measure Score 0     Health Habits and Functional and Cognitive Screenin/22/2023     1:00 PM   Functional & Cognitive Status   Do you have difficulty preparing food and eating? No   Do you have difficulty bathing yourself, getting dressed or grooming yourself? No   Do you have difficulty using the toilet? No   Do you have difficulty moving around from place to place? No   Do you have trouble with steps or getting out of a bed or a chair? No   Current Diet Well Balanced Diet   Dental Exam Up to date   Eye Exam Up to date   Exercise (times per week) Other   Current Exercises Include Other   Do you need help using the phone?  No   Are you deaf or do you have serious difficulty hearing?  No   Do you need help to go to places out of walking distance? No   Do you need help shopping? No   Do you need help preparing meals?  No   Do you need help with housework?  No   Do you need help with laundry? No   Do you need help taking your medications? No   Do you need help managing money? No   Do you ever drive or ride in a car without wearing a seat belt? No   Have you felt unusual stress, anger or loneliness in the last month? No   Who do you live with? Other   If you need help, do you have trouble finding someone available to you? No   Have you been bothered in the last four weeks by sexual problems? No   Do you have difficulty concentrating, remembering or making decisions? No           Age-appropriate Screening Schedule:  Refer to the list below for future screening recommendations based on patient's age, sex and/or medical conditions. Orders for these recommended tests are listed in the plan section. The patient has been provided with a written plan.    Health  Maintenance List  Health Maintenance   Topic Date Due    ANNUAL WELLNESS VISIT  06/22/2024    BMI FOLLOWUP  06/22/2024    COVID-19 Vaccine (6 - 2023-24 season) 01/26/2025    LIPID PANEL  02/19/2025    TDAP/TD VACCINES (3 - Td or Tdap) 04/05/2029    COLORECTAL CANCER SCREENING  03/29/2032    HEPATITIS C SCREENING  Completed    INFLUENZA VACCINE  Completed    Pneumococcal Vaccine 65+  Completed    ZOSTER VACCINE  Completed                                                                                                                                                CMS Preventative Services Quick Reference  Risk Factors Identified During Encounter  None Identified    The above risks/problems have been discussed with the patient.  Pertinent information has been shared with the patient in the After Visit Summary.  An After Visit Summary and PPPS were made available to the patient.    Follow Up:   Next Medicare Wellness visit to be scheduled in 1 year.         Additional E&M Note during same encounter follows:  Patient has additional, significant, and separately identifiable condition(s)/problem(s) that require work above and beyond the Medicare Wellness Visit     Chief Complaint  Hypertension    Subjective   HPI  Jimy is also being seen today for an annual adult preventative physical exam.                 Objective   Vital Signs:  /66 (BP Location: Left arm, Patient Position: Sitting, Cuff Size: Adult)   Pulse 82   Temp 98.4 °F (36.9 °C) (Infrared)   Resp 20   Wt 99.9 kg (220 lb 3.2 oz)   SpO2 98%   BMI 28.26 kg/m²   Physical Exam  Vitals and nursing note reviewed.   Constitutional:       Appearance: He is well-developed.   HENT:      Head: Normocephalic and atraumatic.   Eyes:      General: No scleral icterus.        Right eye: No discharge.         Left eye: No discharge.      Conjunctiva/sclera: Conjunctivae normal.      Pupils: Pupils are equal, round, and reactive to light.   Neck:      Thyroid: No  thyromegaly.      Vascular: No JVD.      Trachea: No tracheal deviation.   Cardiovascular:      Rate and Rhythm: Normal rate and regular rhythm.      Heart sounds: Normal heart sounds. No murmur heard.     No friction rub. No gallop.   Pulmonary:      Effort: Pulmonary effort is normal. No respiratory distress.      Breath sounds: Normal breath sounds. No wheezing or rales.   Chest:      Chest wall: No tenderness.   Abdominal:      General: Bowel sounds are normal. There is no distension.      Palpations: Abdomen is soft. There is no mass.      Tenderness: There is no abdominal tenderness.   Musculoskeletal:         General: Normal range of motion.      Cervical back: Normal range of motion and neck supple.   Skin:     General: Skin is warm and dry.   Neurological:      Mental Status: He is alert and oriented to person, place, and time.      Cranial Nerves: No cranial nerve deficit.      Coordination: Coordination normal.      Deep Tendon Reflexes: Reflexes are normal and symmetric. Reflexes normal.   Psychiatric:         Behavior: Behavior normal.         Thought Content: Thought content normal.         Judgment: Judgment normal.         The following data was reviewed by: Kavitha Kraft MD on 10/07/2024:        Assessment and Plan Additional age appropriate preventative wellness advice topics were discussed during today's preventative wellness exam(some topics already addressed during AWV portion of the note above):    Physical Activity: Advised cardiovascular activity 150 minutes per week as tolerated. (example brisk walk for 30 minutes, 5 days a week).     Nutrition: Discussed nutrition plan with patient. Information shared in after visit summary. Goal is for a well balanced diet to enhance overall health.     Healthy Weight: Discussed current and goal BMI with patient. Steps to attain this goal discussed. Information shared in after visit summary.     Gun Safety Awareness Discussion: Information Shared in  after visit summary.     Tobacco Misuse Discussion: Information shared in after visit summary.     Alcohol Misuse Discussion: Information shared in after visit summary.     Drug Misuse Discussion:  Avoidance of Drug Use recommendation given.  Information shared in after visit summary.     Motor Vehicle Safety Discussion:  Wearing Seatbelt While in Motor Vehicle recommendation. Adhering to posted speed limit recommendation.     Sexual Behavior/Sexual Safety Discussion: Information shared in after visit summary.     Injury Prevention Discussion:  Information shared in after visit summary.                    Essential hypertension  Hypertension is stable and controlled  Continue current treatment regimen.  Medication changes per orders.  Blood pressure will be reassessed in 6 months.  Bifascicular block    Familial hypercholesterolemia   Lipid abnormalities are stable    Plan:  Continue same medication/s without change.      Discussed medication dosage, use, side effects, and goals of treatment in detail.    Counseled patient on lifestyle modifications to help control hyperlipidemia.     Patient Treatment Goals:   LDL goal is less than 70    Followup in 6 months.  Medicare annual wellness visit, subsequent    Elevated hemoglobin A1c measurement    Vitamin D deficiency    Other abnormal glucose    BMI 28.0-28.9,adult  BMI is >= 25 and <30. (Overweight) The following options were offered after discussion;: exercise counseling/recommendations and nutrition counseling/recommendations    Onychomycosis    Ringing in ears, bilateral    Orders Placed This Encounter   Procedures    TSH     Standing Status:   Future     Order Specific Question:   Release to patient     Answer:   Routine Release [[id number]]    Comprehensive Metabolic Panel     Standing Status:   Future     Order Specific Question:   Release to patient     Answer:   Routine Release [[id number]]    Hemoglobin A1c     Standing Status:   Future     Order Specific  Question:   Release to patient     Answer:   Routine Release [[id number]]    Lipid Panel     Standing Status:   Future     Order Specific Question:   Release to patient     Answer:   Routine Release [[id number]]    Vitamin D,25-Hydroxy     Standing Status:   Future     Order Specific Question:   Release to patient     Answer:   Routine Release [[id number]]    CBC & Differential     Standing Status:   Future     Order Specific Question:   Manual Differential     Answer:   No     Order Specific Question:   Release to patient     Answer:   Routine Release [[id number]]           I spent 40 minutes caring for Christopher on this date of service. This time includes time spent by me in the following activities:preparing for the visit, reviewing tests, obtaining and/or reviewing a separately obtained history, performing a medically appropriate examination and/or evaluation , ordering medications, tests, or procedures, and documenting information in the medical record  Follow Up   No follow-ups on file.    Patient was given instructions and counseling regarding his condition or for health maintenance advice. Please see specific information pulled into the AVS if appropriate.  Answers submitted by the patient for this visit:  Other (Submitted on 10/6/2024)  Please describe your symptoms.: Follow-up checkup. No specific reason  Have you had these symptoms before?: Yes  How long have you been having these symptoms?: Greater than 2 weeks  Primary Reason for Visit (Submitted on 10/6/2024)  What is the primary reason for your visit?: Problem Not Listed

## 2024-10-08 ENCOUNTER — TELEPHONE (OUTPATIENT)
Dept: FAMILY MEDICINE CLINIC | Facility: CLINIC | Age: 69
End: 2024-10-08
Payer: MEDICARE

## 2024-10-08 DIAGNOSIS — J01.90 ACUTE SINUSITIS, RECURRENCE NOT SPECIFIED, UNSPECIFIED LOCATION: Primary | ICD-10-CM

## 2024-10-08 DIAGNOSIS — Z00.00 MEDICARE ANNUAL WELLNESS VISIT, SUBSEQUENT: Primary | ICD-10-CM

## 2024-10-08 RX ORDER — AZITHROMYCIN 250 MG/1
TABLET, FILM COATED ORAL
Qty: 6 TABLET | Refills: 0 | Status: SHIPPED | OUTPATIENT
Start: 2024-10-08

## 2024-10-08 RX ORDER — MULTIVIT-MIN/IRON/FOLIC ACID/K 18-600-40
1 CAPSULE ORAL DAILY
Qty: 90 TABLET | Refills: 3 | Status: SHIPPED | OUTPATIENT
Start: 2024-10-08

## 2024-10-08 NOTE — TELEPHONE ENCOUNTER
"    Caller: Christopher Raymond \"Jimy\"    Relationship: Self    Best call back number: [phone]     What medication are you requesting: GUERA    What are your current symptoms: HEAD CONGESTION-SINUS PAIN AND PRESSURE-YELLOW MUCUS-    How long have you been experiencing symptoms: OVER A WEEK    Have you had these symptoms before:    [x] Yes  [] No    Have you been treated for these symptoms before:   [x] Yes  [] No    If a prescription is needed, what is your preferred pharmacy and phone number: [facility] [id number] - [address] - [phone] [facility] [phone]      Additional notes:PATIENT WAS IN YESTERDAY FOR A PHYSICAL AND FEELS LIKE THE COLD IS NOW WORSE AND NEEDS A MEDICATION. IT SEEMS TO BE ALL IN HIS HEAD RIGHT NOW        "

## 2024-10-28 NOTE — PROGRESS NOTES
Subjective:     Encounter Date:11/06/2024      Patient ID: Christopher Raymond is a 69 y.o.   white male, manager of a [employer], from [place], [place].     PHYSICIAN: Reggie Monzon MD  OPHTHALMOLOGIST:  Unknown.    Chief Complaint:   Chief Complaint   Patient presents with    Essential hypertension     1-Yr F/U     Problem List:  Abnormal EKG with asymptomatic bifascicular block:  Abnormal EKG with chronic right bundle branch block with left anterior hemiblock.  Remote acceptable echocardiographic GXT, November 2007.  Residual class I symptoms with remote echocardiographic GXT.  Residual class I symptoms, February 2022, July 2023  Normal stress echo with no evidence of ischemia July 2023.  EF 61%, RADHA -8, trace AR, trace MR.  Remote tonsillectomy, 1967.  Hypertension - probably essential, with recent labile hypertensive blood pressure readings, 2019  Mildly overweight, BMI 29.32.  Dyslipidemia - 10 year ASCVD risk is 15.9% untreated, 7.5% with treatment, August 2020, on rosuvastatin July 2023  Remote cataract surgery, 2016.  Fall with dislocated 5th digit right hand and laceration with suture repair, December 2020  Family history of early cardiac death in his brother, mother had valvular heart disease, sister has congenital heart disease  Tinnitus    Allergies   Allergen Reactions    Penicillins Unknown (See Comments)     As a child    Tramadol Hcl Nausea And Vomiting       Current Outpatient Medications   Medication Instructions    Ascorbic Acid (VITAMIN C PO) 1,000 mg, Daily    aspirin 81 mg, Daily    CoQ-10 100 mg, Oral, Daily    fluconazole (DIFLUCAN) 150 mg, Oral, Weekly    multivitamin with minerals (Multi Complete/Iron) tablet tablet 1 tablet, Oral, Daily    rosuvastatin (CRESTOR) 5 mg, Oral, Daily    telmisartan (MICARDIS) 40 mg, Oral, Daily    Vitamin D 2,000 Units, Oral, Daily         HISTORY OF PRESENT ILLNESS:  The patient is here after 16-month hiatus.   "Patient had a normal stress echo with no evidence of ischemia July 2023.  EF was 61%.  Patient does table tennis a couple days a week and also does walking for exercise.  Prior to his appointment today he was parked on the top level of the parking structure and walked down multiple flights of steps before coming to his appointment today.  He tracks his heart rate at home and average over the past month has been 75 bpm.  He feels like with increasing his physical activity lately that his heart rate used to be in the 80s and now resides in the 70s if he remains physically active.  He just had labs last month with his primary care physician which were acceptable.  His A1c had improved compared to prior studies.  He denies any chest pain, breath, palpitations, dizziness, presyncope, or syncope.  He has not had any hospitalizations, surgeries, or new diagnoses since he was last seen other than a sinus infection.  He has some tinnitus and saw an ears nose throat doctor and was recommended to get hearing aids but he has not done this yet.  His sister who is 10 years younger than he has a congenital valve disease but he is unsure what it is.    ROS   All other systems reviewed and otherwise negative.      ECG 12 Lead    Date/Time: 11/6/2024 11:08 AM  Performed by: Anne-Marie Vasquez APRN    Authorized by: Anne-Marie Vasquez APRN  Rhythm comments: Normal sinus rhythm with sinus arrhythmia, RBBB, abnormal ECG, 83 bpm,  ms,  ms, QTc 462 ms, no significant changes from last ECG January 2023             Objective:       Vitals:    11/06/24 1026 11/06/24 1027 11/06/24 1106   BP: 130/77 124/83    BP Location: Right arm Right arm    Patient Position: Sitting Standing    Cuff Size: Adult Adult    Pulse: 106 111 84   SpO2: 97% 96%    Weight: 102 kg (225 lb 12.8 oz)     Height: 190.5 cm (75\")       Body mass index is 28.22 kg/m².  Wt Readings from Last 2 Encounters:   11/06/24 102 kg (225 lb 12.8 oz)   10/07/24 99.9 kg (220 " lb 3.2 oz)        Constitutional:       Appearance: Healthy appearance. Not in distress.   Neck:      Vascular: No JVR. JVD normal.   Pulmonary:      Effort: Pulmonary effort is normal.      Breath sounds: Normal breath sounds. No wheezing. No rhonchi. No rales.   Chest:      Chest wall: Not tender to palpatation.   Cardiovascular:      PMI at left midclavicular line. Normal rate. Regular rhythm. Normal S1. Normal S2.       Murmurs: There is a systolic murmur at the LLSB.      No gallop.  No click. No rub.   Pulses:     Intact distal pulses.   Edema:     Peripheral edema absent.   Abdominal:      General: Bowel sounds are normal.      Palpations: Abdomen is soft.      Tenderness: There is no abdominal tenderness.   Musculoskeletal: Normal range of motion.         General: No tenderness. Skin:     General: Skin is warm and dry.   Neurological:      General: No focal deficit present.      Mental Status: Alert and oriented to person, place and time.           Lab Review:   Lab Results   Component Value Date    GLUCOSE 91 10/07/2024    BUN 18 10/07/2024    CREATININE 0.95 10/07/2024    EGFRIFNONA 77 01/05/2022    EGFRIFAFRI 100 03/21/2019    BCR 18.9 10/07/2024    CO2 22.7 10/07/2024    CALCIUM 9.3 10/07/2024    PROTENTOTREF 6.9 03/21/2019    ALBUMIN 4.1 10/07/2024    LABIL2 1.8 03/21/2019    AST 34 10/07/2024    ALT 28 10/07/2024       Lab Results   Component Value Date    WBC 6.02 10/07/2024    HGB 14.2 10/07/2024    HCT 42.4 10/07/2024    MCV 99.1 (H) 10/07/2024     10/07/2024       Lab Results   Component Value Date    HGBA1C 5.40 10/07/2024       Lab Results   Component Value Date    TSH 0.990 10/07/2024       Lab Results   Component Value Date    CHOL 109 10/07/2024    CHOL 123 02/19/2024     Lab Results   Component Value Date    TRIG 68 10/07/2024    TRIG 80 02/19/2024     Lab Results   Component Value Date    HDL 29 (L) 10/07/2024    HDL 35 (L) 02/19/2024     Lab Results   Component Value Date    LDL 65  10/07/2024    LDL 72 02/19/2024           Results for orders placed during the hospital encounter of 07/27/23    Adult Stress Echo W/ Cont or Stress Agent if Necessary Per Protocol    Interpretation Summary    Patient denied chest pain with exercise    Expected exercise duration = 8:00. Actual = 8:29. RADHA (-8).  Normal exercise capacity    Patient with sinus rhythm and bifascicular block at baseline.  There were no significant ST changes with exercise.    Resting left ventricular systolic function is normal. Left ventricular ejection fraction calculated 61%    The right ventricular cavity is mildly dilated.    Trace aortic valve regurgitation is present.    Trace mitral valve regurgitation is present.    Segment augmentation had a normal response to stress.  Post exercise EF greater than 70%    Normal stress echo with no significant echocardiographic evidence for myocardial ischemia.            Advance Care Planning   ACP discussion was held with the patient during this visit. Patient does not have an advance directive, declines further assistance.      Assessment:     Overall continued acceptable course with no new interim cardiopulmonary complaints with good functional status. We will defer additional diagnostic or therapeutic intervention from a cardiac perspective at this time.  Patient had a normal stress echo with no evidence of ischemia July 2023.  EF was 61%.       Diagnosis Plan   1. Bifascicular block   Patient had a normal stress echo with no evidence of ischemia July 2023.  EF was 61%.      2. Essential hypertension  Controlled, continue telmisartan      3. Familial hypercholesterolemia  Acceptable lipid panel October 2024, continue rosuvastatin             Plan:         Patient to continue current medications and close follow up with the above providers.  Tentative cardiology follow up in 1 year or patient may return sooner PRN.  Patient to continue monitoring heart rate at home      Electronically  signed by Anne-Marie Vasquez, [place], 11/06/24, 11:11 AM EST.

## 2024-11-06 ENCOUNTER — OFFICE VISIT (OUTPATIENT)
Dept: CARDIOLOGY | Facility: CLINIC | Age: 69
End: 2024-11-06
Payer: MEDICARE

## 2024-11-06 VITALS
HEART RATE: 84 BPM | WEIGHT: 225.8 LBS | HEIGHT: 75 IN | DIASTOLIC BLOOD PRESSURE: 83 MMHG | OXYGEN SATURATION: 96 % | BODY MASS INDEX: 28.07 KG/M2 | SYSTOLIC BLOOD PRESSURE: 124 MMHG

## 2024-11-06 DIAGNOSIS — I45.2 BIFASCICULAR BLOCK: Primary | ICD-10-CM

## 2024-11-06 DIAGNOSIS — I10 ESSENTIAL HYPERTENSION: ICD-10-CM

## 2024-11-06 DIAGNOSIS — E78.01 FAMILIAL HYPERCHOLESTEROLEMIA: ICD-10-CM

## 2024-11-06 PROCEDURE — 99214 OFFICE O/P EST MOD 30 MIN: CPT | Performed by: NURSE PRACTITIONER

## 2024-11-06 PROCEDURE — 1159F MED LIST DOCD IN RCRD: CPT | Performed by: NURSE PRACTITIONER

## 2024-11-06 PROCEDURE — 3078F DIAST BP <80 MM HG: CPT | Performed by: NURSE PRACTITIONER

## 2024-11-06 PROCEDURE — 3074F SYST BP LT 130 MM HG: CPT | Performed by: NURSE PRACTITIONER

## 2024-11-06 PROCEDURE — 93000 ELECTROCARDIOGRAM COMPLETE: CPT | Performed by: NURSE PRACTITIONER

## 2024-11-06 PROCEDURE — 1160F RVW MEDS BY RX/DR IN RCRD: CPT | Performed by: NURSE PRACTITIONER

## 2025-04-07 DIAGNOSIS — B35.1 ONYCHOMYCOSIS: ICD-10-CM

## 2025-04-07 RX ORDER — FLUCONAZOLE 150 MG/1
150 TABLET ORAL WEEKLY
Qty: 12 TABLET | Refills: 3 | OUTPATIENT
Start: 2025-04-07

## 2025-04-14 ENCOUNTER — OFFICE VISIT (OUTPATIENT)
Dept: FAMILY MEDICINE CLINIC | Facility: CLINIC | Age: 70
End: 2025-04-14
Payer: MEDICARE

## 2025-04-14 VITALS
HEART RATE: 81 BPM | WEIGHT: 225.6 LBS | BODY MASS INDEX: 28.2 KG/M2 | OXYGEN SATURATION: 96 % | TEMPERATURE: 97.8 F | SYSTOLIC BLOOD PRESSURE: 124 MMHG | RESPIRATION RATE: 17 BRPM | DIASTOLIC BLOOD PRESSURE: 84 MMHG

## 2025-04-14 DIAGNOSIS — B35.1 ONYCHOMYCOSIS: Primary | ICD-10-CM

## 2025-04-14 DIAGNOSIS — M19.90 OSTEOARTHRITIS, UNSPECIFIED OSTEOARTHRITIS TYPE, UNSPECIFIED SITE: ICD-10-CM

## 2025-04-14 DIAGNOSIS — H93.19 TINNITUS, UNSPECIFIED LATERALITY: ICD-10-CM

## 2025-04-14 PROCEDURE — 1126F AMNT PAIN NOTED NONE PRSNT: CPT | Performed by: FAMILY MEDICINE

## 2025-04-14 PROCEDURE — 1159F MED LIST DOCD IN RCRD: CPT | Performed by: FAMILY MEDICINE

## 2025-04-14 PROCEDURE — 99214 OFFICE O/P EST MOD 30 MIN: CPT | Performed by: FAMILY MEDICINE

## 2025-04-14 PROCEDURE — G2211 COMPLEX E/M VISIT ADD ON: HCPCS | Performed by: FAMILY MEDICINE

## 2025-04-14 PROCEDURE — 1160F RVW MEDS BY RX/DR IN RCRD: CPT | Performed by: FAMILY MEDICINE

## 2025-04-14 PROCEDURE — 3079F DIAST BP 80-89 MM HG: CPT | Performed by: FAMILY MEDICINE

## 2025-04-14 PROCEDURE — 3074F SYST BP LT 130 MM HG: CPT | Performed by: FAMILY MEDICINE

## 2025-04-14 RX ORDER — NAPROXEN SODIUM 220 MG/1
220 TABLET, FILM COATED ORAL AS NEEDED
COMMUNITY

## 2025-04-14 RX ORDER — MELOXICAM 7.5 MG/1
7.5 TABLET ORAL DAILY
Qty: 30 TABLET | Refills: 3 | Status: SHIPPED | OUTPATIENT
Start: 2025-04-14

## 2025-04-14 NOTE — PROGRESS NOTES
Subjective   Christopher Raymond is a 69 y.o. male.     Arthritis  Pertinent negatives include no dysuria, fatigue, fever or rash.   Tinnitus  Pertinent negative symptoms include no abdominal pain, no chest pain, no chills, no congestion, no cough, no diaphoresis, no fatigue, no fever, no headaches, no myalgias, no nausea, no neck pain, no numbness, no rash, no sore throat, no dysuria, no vomiting and no weakness.    he saw podiatry and had clipping.  Will continue diflucan weekly.  He may try a light uv.     I have reviewed the pathology from podiatry 1/29/24    He reports some aches and pains and arthritis in knees.  Does not want to change statin.  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  He notices 2 days after he is feeling better after the aleve.    He is taking mirilax and metamucil.      He has herbal supplement he is taking for ringing in ears and that is helping some.     The following portions of the patient's history were reviewed and updated as appropriate: allergies, current medications, past family history, past medical history, past social history, past surgical history, and problem list.    Review of Systems   Constitutional:  Negative for chills, diaphoresis, fatigue and fever.   HENT:  Positive for tinnitus. Negative for congestion and sore throat.    Respiratory:  Negative for cough.    Cardiovascular:  Negative for chest pain.   Gastrointestinal:  Negative for abdominal pain, nausea and vomiting.   Genitourinary:  Negative for dysuria.   Musculoskeletal:  Positive for arthritis. Negative for myalgias and neck pain.   Skin:  Negative for rash.   Neurological:  Negative for weakness, numbness and headaches.       Objective     Vitals:    04/14/25 0817   BP: 124/84   BP Location: Left arm   Patient Position: Sitting   Cuff Size: Adult   Pulse: 81   Resp: 17   Temp: 97.8 °F (36.6 °C)   TempSrc: Infrared   SpO2: 96%   Weight: 102 kg (225 lb 9.6 oz)       Physical Exam  Vitals and  nursing note reviewed.   Constitutional:       Appearance: He is well-developed.   HENT:      Head: Normocephalic and atraumatic.   Eyes:      General: No scleral icterus.        Right eye: No discharge.         Left eye: No discharge.      Conjunctiva/sclera: Conjunctivae normal.      Pupils: Pupils are equal, round, and reactive to light.   Neck:      Thyroid: No thyromegaly.      Vascular: No JVD.      Trachea: No tracheal deviation.   Cardiovascular:      Rate and Rhythm: Normal rate and regular rhythm.      Heart sounds: Normal heart sounds. No murmur heard.     No friction rub. No gallop.   Pulmonary:      Effort: Pulmonary effort is normal. No respiratory distress.      Breath sounds: Normal breath sounds. No wheezing or rales.   Chest:      Chest wall: No tenderness.   Abdominal:      General: Bowel sounds are normal. There is no distension.      Palpations: Abdomen is soft. There is no mass.      Tenderness: There is no abdominal tenderness.   Musculoskeletal:         General: Normal range of motion.      Cervical back: Normal range of motion and neck supple.   Skin:     General: Skin is warm and dry.      Comments: Thickened yellow right great nail.      Neurological:      Mental Status: He is alert and oriented to person, place, and time.      Cranial Nerves: No cranial nerve deficit.      Coordination: Coordination normal.      Deep Tendon Reflexes: Reflexes are normal and symmetric. Reflexes normal.   Psychiatric:         Behavior: Behavior normal.         Thought Content: Thought content normal.         Judgment: Judgment normal.         Assessment & Plan     Problem List Items Addressed This Visit          ENT    Tinnitus       Skin    Onychomycosis - Primary     Other Visit Diagnoses         Osteoarthritis, unspecified osteoarthritis type, unspecified site        Relevant Medications    meloxicam (Mobic) 7.5 MG tablet          We discussed that calcium scoring test but he is already established with  cardiology and has had a stress test.  He is currently taking a cholesterol medication.  Tolerating that well.  Will consider starting him on meloxicam as needed once or twice a week.    Will continue with fluconazole weekly and we discussed the dystrophic nail and appropriately trimming that nail making sure that his shoes are not causing trauma to the nail.